# Patient Record
Sex: FEMALE | Race: WHITE | Employment: OTHER | URBAN - METROPOLITAN AREA
[De-identification: names, ages, dates, MRNs, and addresses within clinical notes are randomized per-mention and may not be internally consistent; named-entity substitution may affect disease eponyms.]

---

## 2018-05-03 LAB
T4 FREE SERPL-MCNC: 1.27 NG/DL (ref 0.6–1.7)
TSH SERPL DL<=0.05 MIU/L-ACNC: 0.29 UIU/M (ref 0.45–5.33)

## 2018-06-20 ENCOUNTER — TRANSCRIBE ORDERS (OUTPATIENT)
Dept: ADMINISTRATIVE | Facility: HOSPITAL | Age: 78
End: 2018-06-20

## 2018-06-20 DIAGNOSIS — M81.0 OSTEOPOROSIS, UNSPECIFIED OSTEOPOROSIS TYPE, UNSPECIFIED PATHOLOGICAL FRACTURE PRESENCE: Primary | ICD-10-CM

## 2018-07-23 ENCOUNTER — HOSPITAL ENCOUNTER (OUTPATIENT)
Dept: BONE DENSITY | Facility: HOSPITAL | Age: 78
Discharge: HOME | End: 2018-07-23
Attending: INTERNAL MEDICINE
Payer: MEDICARE

## 2018-07-23 DIAGNOSIS — M81.0 OSTEOPOROSIS, UNSPECIFIED OSTEOPOROSIS TYPE, UNSPECIFIED PATHOLOGICAL FRACTURE PRESENCE: ICD-10-CM

## 2018-07-23 PROCEDURE — 77080 DXA BONE DENSITY AXIAL: CPT

## 2018-11-26 ENCOUNTER — APPOINTMENT (OUTPATIENT)
Dept: LAB | Facility: MEDICAL CENTER | Age: 78
End: 2018-11-26
Payer: MEDICARE

## 2018-11-26 ENCOUNTER — TRANSCRIBE ORDERS (OUTPATIENT)
Dept: LAB | Facility: MEDICAL CENTER | Age: 78
End: 2018-11-26

## 2018-11-26 DIAGNOSIS — M81.0 OSTEOPOROSIS, UNSPECIFIED OSTEOPOROSIS TYPE, UNSPECIFIED PATHOLOGICAL FRACTURE PRESENCE: Primary | ICD-10-CM

## 2018-11-26 DIAGNOSIS — I10 HYPERTENSION, UNSPECIFIED TYPE: ICD-10-CM

## 2018-11-26 DIAGNOSIS — E07.9 THYROID DYSFUNCTION: ICD-10-CM

## 2018-11-26 DIAGNOSIS — E78.5 HYPERLIPIDEMIA, UNSPECIFIED HYPERLIPIDEMIA TYPE: ICD-10-CM

## 2018-11-26 DIAGNOSIS — M81.0 OSTEOPOROSIS, UNSPECIFIED OSTEOPOROSIS TYPE, UNSPECIFIED PATHOLOGICAL FRACTURE PRESENCE: ICD-10-CM

## 2018-11-26 LAB
ALBUMIN SERPL BCP-MCNC: 3.5 G/DL (ref 3.5–5)
ALP SERPL-CCNC: 41 U/L (ref 46–116)
ALT SERPL W P-5'-P-CCNC: 20 U/L (ref 12–78)
ANION GAP SERPL CALCULATED.3IONS-SCNC: 5 MMOL/L (ref 4–13)
AST SERPL W P-5'-P-CCNC: 20 U/L (ref 5–45)
BILIRUB SERPL-MCNC: 0.2 MG/DL (ref 0.2–1)
BUN SERPL-MCNC: 20 MG/DL (ref 5–25)
CALCIUM SERPL-MCNC: 9.3 MG/DL (ref 8.3–10.1)
CHLORIDE SERPL-SCNC: 105 MMOL/L (ref 100–108)
CHOLEST SERPL-MCNC: 221 MG/DL (ref 50–200)
CO2 SERPL-SCNC: 27 MMOL/L (ref 21–32)
CREAT SERPL-MCNC: 0.83 MG/DL (ref 0.6–1.3)
ERYTHROCYTE [DISTWIDTH] IN BLOOD BY AUTOMATED COUNT: 13.7 % (ref 11.6–15.1)
GFR SERPL CREATININE-BSD FRML MDRD: 67 ML/MIN/1.73SQ M
GLUCOSE P FAST SERPL-MCNC: 85 MG/DL (ref 65–99)
HCT VFR BLD AUTO: 38.6 % (ref 34.8–46.1)
HDLC SERPL-MCNC: 50 MG/DL (ref 40–60)
HGB BLD-MCNC: 12.1 G/DL (ref 11.5–15.4)
LDLC SERPL CALC-MCNC: 139 MG/DL (ref 0–100)
MCH RBC QN AUTO: 30.3 PG (ref 26.8–34.3)
MCHC RBC AUTO-ENTMCNC: 31.3 G/DL (ref 31.4–37.4)
MCV RBC AUTO: 97 FL (ref 82–98)
NONHDLC SERPL-MCNC: 171 MG/DL
PLATELET # BLD AUTO: 191 THOUSANDS/UL (ref 149–390)
PMV BLD AUTO: 10.8 FL (ref 8.9–12.7)
POTASSIUM SERPL-SCNC: 4.1 MMOL/L (ref 3.5–5.3)
PROT SERPL-MCNC: 6.9 G/DL (ref 6.4–8.2)
RBC # BLD AUTO: 4 MILLION/UL (ref 3.81–5.12)
SODIUM SERPL-SCNC: 137 MMOL/L (ref 136–145)
T4 SERPL-MCNC: 9.5 UG/DL (ref 4.7–13.3)
TRIGL SERPL-MCNC: 160 MG/DL
TSH SERPL DL<=0.05 MIU/L-ACNC: 5.41 UIU/ML (ref 0.36–3.74)
WBC # BLD AUTO: 3.95 THOUSAND/UL (ref 4.31–10.16)

## 2018-11-26 PROCEDURE — 85027 COMPLETE CBC AUTOMATED: CPT

## 2018-11-26 PROCEDURE — 80053 COMPREHEN METABOLIC PANEL: CPT

## 2018-11-26 PROCEDURE — 84443 ASSAY THYROID STIM HORMONE: CPT

## 2018-11-26 PROCEDURE — 36415 COLL VENOUS BLD VENIPUNCTURE: CPT

## 2018-11-26 PROCEDURE — 80061 LIPID PANEL: CPT

## 2018-11-26 PROCEDURE — 84436 ASSAY OF TOTAL THYROXINE: CPT

## 2019-08-03 ENCOUNTER — APPOINTMENT (EMERGENCY)
Dept: RADIOLOGY | Facility: HOSPITAL | Age: 79
DRG: 282 | End: 2019-08-03
Payer: MEDICARE

## 2019-08-03 ENCOUNTER — APPOINTMENT (EMERGENCY)
Dept: CT IMAGING | Facility: HOSPITAL | Age: 79
DRG: 282 | End: 2019-08-03
Payer: MEDICARE

## 2019-08-03 ENCOUNTER — HOSPITAL ENCOUNTER (INPATIENT)
Facility: HOSPITAL | Age: 79
LOS: 2 days | Discharge: HOME | DRG: 282 | End: 2019-08-06
Attending: EMERGENCY MEDICINE | Admitting: FAMILY MEDICINE
Payer: MEDICARE

## 2019-08-03 DIAGNOSIS — R00.2 PALPITATIONS: ICD-10-CM

## 2019-08-03 DIAGNOSIS — Z86.79 HISTORY OF SUPRAVENTRICULAR TACHYCARDIA: ICD-10-CM

## 2019-08-03 DIAGNOSIS — R42 DIZZINESS: ICD-10-CM

## 2019-08-03 DIAGNOSIS — R11.2 NAUSEA AND VOMITING: ICD-10-CM

## 2019-08-03 DIAGNOSIS — I47.1 PAROXYSMAL SVT (SUPRAVENTRICULAR TACHYCARDIA) (HCC): Chronic | ICD-10-CM

## 2019-08-03 DIAGNOSIS — R77.8 ELEVATED TROPONIN: ICD-10-CM

## 2019-08-03 DIAGNOSIS — R55 NEAR SYNCOPE: ICD-10-CM

## 2019-08-03 DIAGNOSIS — E78.49 OTHER HYPERLIPIDEMIA: ICD-10-CM

## 2019-08-03 DIAGNOSIS — I10 ESSENTIAL HYPERTENSION: ICD-10-CM

## 2019-08-03 DIAGNOSIS — I21.4 NSTEMI, INITIAL EPISODE OF CARE (HCC): Primary | ICD-10-CM

## 2019-08-03 LAB
ALBUMIN SERPL BCP-MCNC: 3.8 G/DL (ref 3.5–5)
ALP SERPL-CCNC: 41 U/L (ref 46–116)
ALT SERPL W P-5'-P-CCNC: 19 U/L (ref 12–78)
ANION GAP SERPL CALCULATED.3IONS-SCNC: 15 MMOL/L (ref 4–13)
AST SERPL W P-5'-P-CCNC: 16 U/L (ref 5–45)
BACTERIA UR QL AUTO: NORMAL /HPF
BASOPHILS # BLD AUTO: 0.01 THOUSANDS/ΜL (ref 0–0.1)
BASOPHILS NFR BLD AUTO: 0 % (ref 0–1)
BILIRUB SERPL-MCNC: 0.5 MG/DL (ref 0.2–1)
BILIRUB UR QL STRIP: NEGATIVE
BUN SERPL-MCNC: 19 MG/DL (ref 5–25)
CALCIUM SERPL-MCNC: 8.7 MG/DL (ref 8.3–10.1)
CHLORIDE SERPL-SCNC: 99 MMOL/L (ref 100–108)
CLARITY UR: CLEAR
CO2 SERPL-SCNC: 22 MMOL/L (ref 21–32)
COLOR UR: YELLOW
CREAT SERPL-MCNC: 0.71 MG/DL (ref 0.6–1.3)
EOSINOPHIL # BLD AUTO: 0 THOUSAND/ΜL (ref 0–0.61)
EOSINOPHIL NFR BLD AUTO: 0 % (ref 0–6)
ERYTHROCYTE [DISTWIDTH] IN BLOOD BY AUTOMATED COUNT: 13.2 % (ref 11.6–15.1)
GFR SERPL CREATININE-BSD FRML MDRD: 81 ML/MIN/1.73SQ M
GLUCOSE SERPL-MCNC: 133 MG/DL (ref 65–140)
GLUCOSE UR STRIP-MCNC: NEGATIVE MG/DL
HCT VFR BLD AUTO: 39 % (ref 34.8–46.1)
HGB BLD-MCNC: 12.9 G/DL (ref 11.5–15.4)
HGB UR QL STRIP.AUTO: NEGATIVE
IMM GRANULOCYTES # BLD AUTO: 0.03 THOUSAND/UL (ref 0–0.2)
IMM GRANULOCYTES NFR BLD AUTO: 1 % (ref 0–2)
KETONES UR STRIP-MCNC: ABNORMAL MG/DL
LEUKOCYTE ESTERASE UR QL STRIP: NEGATIVE
LYMPHOCYTES # BLD AUTO: 1.3 THOUSANDS/ΜL (ref 0.6–4.47)
LYMPHOCYTES NFR BLD AUTO: 20 % (ref 14–44)
MAGNESIUM SERPL-MCNC: 2 MG/DL (ref 1.6–2.6)
MCH RBC QN AUTO: 30.6 PG (ref 26.8–34.3)
MCHC RBC AUTO-ENTMCNC: 33.1 G/DL (ref 31.4–37.4)
MCV RBC AUTO: 93 FL (ref 82–98)
MONOCYTES # BLD AUTO: 0.23 THOUSAND/ΜL (ref 0.17–1.22)
MONOCYTES NFR BLD AUTO: 4 % (ref 4–12)
NEUTROPHILS # BLD AUTO: 4.93 THOUSANDS/ΜL (ref 1.85–7.62)
NEUTS SEG NFR BLD AUTO: 75 % (ref 43–75)
NITRITE UR QL STRIP: NEGATIVE
NON-SQ EPI CELLS URNS QL MICRO: NORMAL /HPF
NRBC BLD AUTO-RTO: 0 /100 WBCS
PH UR STRIP.AUTO: 7.5 [PH]
PHOSPHATE SERPL-MCNC: 2.6 MG/DL (ref 2.3–4.1)
PLATELET # BLD AUTO: 251 THOUSANDS/UL (ref 149–390)
PMV BLD AUTO: 9.9 FL (ref 8.9–12.7)
POTASSIUM SERPL-SCNC: 3.6 MMOL/L (ref 3.5–5.3)
PROT SERPL-MCNC: 6.9 G/DL (ref 6.4–8.2)
PROT UR STRIP-MCNC: ABNORMAL MG/DL
RBC # BLD AUTO: 4.21 MILLION/UL (ref 3.81–5.12)
RBC #/AREA URNS AUTO: NORMAL /HPF
SODIUM SERPL-SCNC: 136 MMOL/L (ref 136–145)
SP GR UR STRIP.AUTO: 1.01 (ref 1–1.03)
TROPONIN I SERPL-MCNC: <0.02 NG/ML
TSH SERPL DL<=0.05 MIU/L-ACNC: 0.6 UIU/ML (ref 0.36–3.74)
UROBILINOGEN UR QL STRIP.AUTO: 0.2 E.U./DL
WBC # BLD AUTO: 6.5 THOUSAND/UL (ref 4.31–10.16)
WBC #/AREA URNS AUTO: NORMAL /HPF

## 2019-08-03 PROCEDURE — 96374 THER/PROPH/DIAG INJ IV PUSH: CPT

## 2019-08-03 PROCEDURE — 84484 ASSAY OF TROPONIN QUANT: CPT | Performed by: EMERGENCY MEDICINE

## 2019-08-03 PROCEDURE — 83735 ASSAY OF MAGNESIUM: CPT | Performed by: EMERGENCY MEDICINE

## 2019-08-03 PROCEDURE — 99285 EMERGENCY DEPT VISIT HI MDM: CPT

## 2019-08-03 PROCEDURE — 85025 COMPLETE CBC W/AUTO DIFF WBC: CPT | Performed by: EMERGENCY MEDICINE

## 2019-08-03 PROCEDURE — 96361 HYDRATE IV INFUSION ADD-ON: CPT

## 2019-08-03 PROCEDURE — 80053 COMPREHEN METABOLIC PANEL: CPT | Performed by: EMERGENCY MEDICINE

## 2019-08-03 PROCEDURE — 71046 X-RAY EXAM CHEST 2 VIEWS: CPT

## 2019-08-03 PROCEDURE — 1124F ACP DISCUSS-NO DSCNMKR DOCD: CPT | Performed by: FAMILY MEDICINE

## 2019-08-03 PROCEDURE — 99285 EMERGENCY DEPT VISIT HI MDM: CPT | Performed by: EMERGENCY MEDICINE

## 2019-08-03 PROCEDURE — 81001 URINALYSIS AUTO W/SCOPE: CPT | Performed by: EMERGENCY MEDICINE

## 2019-08-03 PROCEDURE — 70450 CT HEAD/BRAIN W/O DYE: CPT

## 2019-08-03 PROCEDURE — 84443 ASSAY THYROID STIM HORMONE: CPT | Performed by: EMERGENCY MEDICINE

## 2019-08-03 PROCEDURE — 93005 ELECTROCARDIOGRAM TRACING: CPT

## 2019-08-03 PROCEDURE — 36415 COLL VENOUS BLD VENIPUNCTURE: CPT | Performed by: EMERGENCY MEDICINE

## 2019-08-03 PROCEDURE — 84100 ASSAY OF PHOSPHORUS: CPT | Performed by: EMERGENCY MEDICINE

## 2019-08-03 RX ORDER — TRAZODONE HYDROCHLORIDE 50 MG/1
50 TABLET ORAL
COMMUNITY

## 2019-08-03 RX ORDER — METOPROLOL SUCCINATE 50 MG/1
50 TABLET, EXTENDED RELEASE ORAL DAILY
Status: DISCONTINUED | OUTPATIENT
Start: 2019-08-04 | End: 2019-08-06 | Stop reason: HOSPADM

## 2019-08-03 RX ORDER — ONDANSETRON 2 MG/ML
4 INJECTION INTRAMUSCULAR; INTRAVENOUS ONCE
Status: COMPLETED | OUTPATIENT
Start: 2019-08-03 | End: 2019-08-03

## 2019-08-03 RX ORDER — LEVOTHYROXINE SODIUM 112 UG/1
112 TABLET ORAL DAILY
COMMUNITY

## 2019-08-03 RX ORDER — ONDANSETRON 2 MG/ML
4 INJECTION INTRAMUSCULAR; INTRAVENOUS EVERY 6 HOURS PRN
Status: DISCONTINUED | OUTPATIENT
Start: 2019-08-03 | End: 2019-08-06 | Stop reason: HOSPADM

## 2019-08-03 RX ORDER — MECLIZINE HCL 12.5 MG/1
25 TABLET ORAL ONCE
Status: COMPLETED | OUTPATIENT
Start: 2019-08-03 | End: 2019-08-03

## 2019-08-03 RX ORDER — LEVOTHYROXINE SODIUM 0.1 MG/1
100 TABLET ORAL
Status: DISCONTINUED | OUTPATIENT
Start: 2019-08-04 | End: 2019-08-06 | Stop reason: HOSPADM

## 2019-08-03 RX ORDER — METOPROLOL SUCCINATE 50 MG/1
50 TABLET, EXTENDED RELEASE ORAL DAILY
COMMUNITY
End: 2019-08-06 | Stop reason: HOSPADM

## 2019-08-03 RX ORDER — SODIUM CHLORIDE 9 MG/ML
75 INJECTION, SOLUTION INTRAVENOUS CONTINUOUS
Status: DISCONTINUED | OUTPATIENT
Start: 2019-08-03 | End: 2019-08-06 | Stop reason: HOSPADM

## 2019-08-03 RX ORDER — SACCHAROMYCES BOULARDII 250 MG
250 CAPSULE ORAL 2 TIMES DAILY
COMMUNITY
End: 2021-05-14 | Stop reason: HOSPADM

## 2019-08-03 RX ORDER — MELATONIN
1000 DAILY
Status: DISCONTINUED | OUTPATIENT
Start: 2019-08-04 | End: 2019-08-06 | Stop reason: HOSPADM

## 2019-08-03 RX ORDER — MELATONIN
1000 DAILY
COMMUNITY

## 2019-08-03 RX ORDER — SACCHAROMYCES BOULARDII 250 MG
250 CAPSULE ORAL 2 TIMES DAILY
Status: DISCONTINUED | OUTPATIENT
Start: 2019-08-04 | End: 2019-08-06 | Stop reason: HOSPADM

## 2019-08-03 RX ADMIN — SODIUM CHLORIDE 500 ML: 0.9 INJECTION, SOLUTION INTRAVENOUS at 20:01

## 2019-08-03 RX ADMIN — ONDANSETRON HYDROCHLORIDE 4 MG: 2 SOLUTION INTRAMUSCULAR; INTRAVENOUS at 20:02

## 2019-08-03 RX ADMIN — MECLIZINE 25 MG: 12.5 TABLET ORAL at 20:00

## 2019-08-04 PROBLEM — E03.8 OTHER SPECIFIED HYPOTHYROIDISM: Status: ACTIVE | Noted: 2019-08-04

## 2019-08-04 PROBLEM — R55 NEAR SYNCOPE: Status: ACTIVE | Noted: 2019-08-04

## 2019-08-04 LAB
ALBUMIN SERPL BCP-MCNC: 3.7 G/DL (ref 3.5–5)
ALP SERPL-CCNC: 44 U/L (ref 46–116)
ALT SERPL W P-5'-P-CCNC: 17 U/L (ref 12–78)
ANION GAP SERPL CALCULATED.3IONS-SCNC: 11 MMOL/L (ref 4–13)
APTT PPP: 32 SECONDS (ref 23–37)
AST SERPL W P-5'-P-CCNC: 19 U/L (ref 5–45)
BILIRUB SERPL-MCNC: 0.6 MG/DL (ref 0.2–1)
BUN SERPL-MCNC: 13 MG/DL (ref 5–25)
CALCIUM SERPL-MCNC: 8.5 MG/DL (ref 8.3–10.1)
CHLORIDE SERPL-SCNC: 100 MMOL/L (ref 100–108)
CHOLEST SERPL-MCNC: 254 MG/DL (ref 50–200)
CO2 SERPL-SCNC: 24 MMOL/L (ref 21–32)
CREAT SERPL-MCNC: 0.64 MG/DL (ref 0.6–1.3)
ERYTHROCYTE [DISTWIDTH] IN BLOOD BY AUTOMATED COUNT: 13.4 % (ref 11.6–15.1)
EST. AVERAGE GLUCOSE BLD GHB EST-MCNC: 111 MG/DL
GFR SERPL CREATININE-BSD FRML MDRD: 85 ML/MIN/1.73SQ M
GLUCOSE SERPL-MCNC: 96 MG/DL (ref 65–140)
HBA1C MFR BLD: 5.5 % (ref 4.2–6.3)
HCT VFR BLD AUTO: 36.6 % (ref 34.8–46.1)
HDLC SERPL-MCNC: 70 MG/DL (ref 40–60)
HGB BLD-MCNC: 12.1 G/DL (ref 11.5–15.4)
INR PPP: 1 (ref 0.84–1.19)
LDLC SERPL CALC-MCNC: 176 MG/DL (ref 0–100)
MAGNESIUM SERPL-MCNC: 2.1 MG/DL (ref 1.6–2.6)
MCH RBC QN AUTO: 31.1 PG (ref 26.8–34.3)
MCHC RBC AUTO-ENTMCNC: 33.1 G/DL (ref 31.4–37.4)
MCV RBC AUTO: 94 FL (ref 82–98)
NONHDLC SERPL-MCNC: 184 MG/DL
PHOSPHATE SERPL-MCNC: 2.9 MG/DL (ref 2.3–4.1)
PLATELET # BLD AUTO: 215 THOUSANDS/UL (ref 149–390)
PLATELET # BLD AUTO: 221 THOUSANDS/UL (ref 149–390)
PMV BLD AUTO: 9.9 FL (ref 8.9–12.7)
PMV BLD AUTO: 9.9 FL (ref 8.9–12.7)
POTASSIUM SERPL-SCNC: 3.7 MMOL/L (ref 3.5–5.3)
PROT SERPL-MCNC: 6.9 G/DL (ref 6.4–8.2)
PROTHROMBIN TIME: 13.2 SECONDS (ref 11.6–14.5)
RBC # BLD AUTO: 3.89 MILLION/UL (ref 3.81–5.12)
SODIUM SERPL-SCNC: 135 MMOL/L (ref 136–145)
TRIGL SERPL-MCNC: 42 MG/DL
TROPONIN I SERPL-MCNC: 0.08 NG/ML
TROPONIN I SERPL-MCNC: 0.17 NG/ML
TROPONIN I SERPL-MCNC: 0.17 NG/ML
WBC # BLD AUTO: 6.98 THOUSAND/UL (ref 4.31–10.16)

## 2019-08-04 PROCEDURE — 84484 ASSAY OF TROPONIN QUANT: CPT | Performed by: PHYSICIAN ASSISTANT

## 2019-08-04 PROCEDURE — G8979 MOBILITY GOAL STATUS: HCPCS | Performed by: PHYSICAL THERAPIST

## 2019-08-04 PROCEDURE — 83036 HEMOGLOBIN GLYCOSYLATED A1C: CPT | Performed by: PHYSICIAN ASSISTANT

## 2019-08-04 PROCEDURE — G8978 MOBILITY CURRENT STATUS: HCPCS | Performed by: PHYSICAL THERAPIST

## 2019-08-04 PROCEDURE — 80061 LIPID PANEL: CPT | Performed by: PHYSICIAN ASSISTANT

## 2019-08-04 PROCEDURE — G8989 SELF CARE D/C STATUS: HCPCS | Performed by: DEVELOPMENTAL THERAPIST

## 2019-08-04 PROCEDURE — 99222 1ST HOSP IP/OBS MODERATE 55: CPT | Performed by: FAMILY MEDICINE

## 2019-08-04 PROCEDURE — 97166 OT EVAL MOD COMPLEX 45 MIN: CPT | Performed by: DEVELOPMENTAL THERAPIST

## 2019-08-04 PROCEDURE — G8987 SELF CARE CURRENT STATUS: HCPCS | Performed by: DEVELOPMENTAL THERAPIST

## 2019-08-04 PROCEDURE — 85027 COMPLETE CBC AUTOMATED: CPT | Performed by: PHYSICIAN ASSISTANT

## 2019-08-04 PROCEDURE — 85049 AUTOMATED PLATELET COUNT: CPT | Performed by: PHYSICIAN ASSISTANT

## 2019-08-04 PROCEDURE — 84100 ASSAY OF PHOSPHORUS: CPT | Performed by: PHYSICIAN ASSISTANT

## 2019-08-04 PROCEDURE — 80053 COMPREHEN METABOLIC PANEL: CPT | Performed by: PHYSICIAN ASSISTANT

## 2019-08-04 PROCEDURE — 97163 PT EVAL HIGH COMPLEX 45 MIN: CPT | Performed by: PHYSICAL THERAPIST

## 2019-08-04 PROCEDURE — 85610 PROTHROMBIN TIME: CPT | Performed by: PHYSICIAN ASSISTANT

## 2019-08-04 PROCEDURE — 85730 THROMBOPLASTIN TIME PARTIAL: CPT | Performed by: PHYSICIAN ASSISTANT

## 2019-08-04 PROCEDURE — G8988 SELF CARE GOAL STATUS: HCPCS | Performed by: DEVELOPMENTAL THERAPIST

## 2019-08-04 PROCEDURE — 97535 SELF CARE MNGMENT TRAINING: CPT | Performed by: DEVELOPMENTAL THERAPIST

## 2019-08-04 PROCEDURE — 83735 ASSAY OF MAGNESIUM: CPT | Performed by: PHYSICIAN ASSISTANT

## 2019-08-04 RX ORDER — ASPIRIN 81 MG/1
81 TABLET, CHEWABLE ORAL DAILY
Status: DISCONTINUED | OUTPATIENT
Start: 2019-08-04 | End: 2019-08-06 | Stop reason: HOSPADM

## 2019-08-04 RX ORDER — ACETAMINOPHEN 325 MG/1
650 TABLET ORAL EVERY 6 HOURS PRN
Status: DISCONTINUED | OUTPATIENT
Start: 2019-08-04 | End: 2019-08-06 | Stop reason: HOSPADM

## 2019-08-04 RX ORDER — TRAZODONE HYDROCHLORIDE 50 MG/1
50 TABLET ORAL
Status: DISCONTINUED | OUTPATIENT
Start: 2019-08-04 | End: 2019-08-06 | Stop reason: HOSPADM

## 2019-08-04 RX ORDER — ATORVASTATIN CALCIUM 10 MG/1
20 TABLET, FILM COATED ORAL
Status: DISCONTINUED | OUTPATIENT
Start: 2019-08-04 | End: 2019-08-06 | Stop reason: HOSPADM

## 2019-08-04 RX ADMIN — ASPIRIN 81 MG 81 MG: 81 TABLET ORAL at 10:36

## 2019-08-04 RX ADMIN — TRAZODONE HYDROCHLORIDE 50 MG: 50 TABLET ORAL at 22:03

## 2019-08-04 RX ADMIN — Medication 30 MG: at 10:36

## 2019-08-04 RX ADMIN — MELATONIN 1000 UNITS: at 08:20

## 2019-08-04 RX ADMIN — Medication 250 MG: at 17:10

## 2019-08-04 RX ADMIN — ACETAMINOPHEN 650 MG: 325 TABLET, FILM COATED ORAL at 22:03

## 2019-08-04 RX ADMIN — Medication 250 MG: at 08:08

## 2019-08-04 RX ADMIN — SODIUM CHLORIDE 75 ML/HR: 0.9 INJECTION, SOLUTION INTRAVENOUS at 00:00

## 2019-08-04 RX ADMIN — METOPROLOL SUCCINATE 50 MG: 50 TABLET, EXTENDED RELEASE ORAL at 08:08

## 2019-08-04 RX ADMIN — SODIUM CHLORIDE 75 ML/HR: 0.9 INJECTION, SOLUTION INTRAVENOUS at 13:40

## 2019-08-04 RX ADMIN — ACETAMINOPHEN 650 MG: 325 TABLET, FILM COATED ORAL at 13:38

## 2019-08-04 RX ADMIN — ENOXAPARIN SODIUM 40 MG: 40 INJECTION SUBCUTANEOUS at 08:09

## 2019-08-04 RX ADMIN — Medication 400 MG: at 00:00

## 2019-08-04 RX ADMIN — ATORVASTATIN CALCIUM 20 MG: 10 TABLET, FILM COATED ORAL at 17:10

## 2019-08-04 NOTE — H&P
[facility] Internal Medicine  H&P- Rosalina Muff 1940, [de-identified] y o  female MRN: [id number]    Unit/Bed#: 404-01 Encounter: [id number]    Primary Care Provider: Garth Wright DO   Date and time admitted to hospital: 8/3/2019  7:24 PM        * Near syncope  Assessment & Plan  She report that she had onset of palpitations about 1 pm  Followed by onset of dizziness, nausea and vomiting  She said that she felt weak as though she was going to pass out  Head CT shows-No acute intracranial abnormality    No recent trauma,   Admit on observation  Telemetry monitoring  Orthostatic Vital signs  IV normal saline  Continue home medications  Monitor Vital signs  Check ECHO when available  Am labs  Supportive care      Dizziness  Assessment & Plan  She reports onset of dizziness   Associated with nausea, vomiting, lightheadedness  She reports dizziness started after onset of palpitations  She received meclizine 25 mg PO in the ER   PRN meclizine   IV normal saline   Monitor for new onset of headache, or additional episodes of vomiting  Close monitoring      Palpitations  Assessment & Plan  She reports that she had onset of palpitations  She also reports having episodes of SVT in the past  EKG shows-Sinus rhythm with PACs at rate of 94 bpm  Telemetry monitoring  Trend troponin X 3 sets  Repeat EKG if new onset of Palpitations  Consider ECHO when available  Consider inpatient Cardiology consult when available    Other hyperlipidemia  Assessment & Plan  Not currently on medication regimen  Will check lipid panel  Encourage close PCP follow up    Essential hypertension  Assessment & Plan  Blood pressure initially elevated upon arrival to the ER  Currently stable  Continue home medications    Other specified hypothyroidism  Assessment & Plan  Continue levothyroxine          VTE Prophylaxis: Enoxaparin (Lovenox)  / sequential compression device   Code Status: Level 3- DNAR/DNI  POLST: POLST form is not discussed and not completed at this time  Discussion with family: None    Anticipated Length of Stay:  Patient will be admitted on an Observation basis with an anticipated length of stay of  , 2 midnights  Justification for Hospital Stay: Near Syncope, palpitations,     Total Time for Visit, including Counseling / Coordination of Care: 30 minutes  Greater than 50% of this total time spent on direct patient counseling and coordination of care  Chief Complaint:   palpitations    History of Present Illness:    Velma Kirk is a [de-identified] y o  female who presents to the ER with complaints of palpitations starting about 1 pm yesterday followed by episodes of dizziness, nausea and vomiting  She denies chest pan or discomfort, headaches, fever, chills, abdominal pain, visual disturbances, numbness, tingling  She has a history of hypertension, hyperlipidemia and hypothyroidism  She also reports that she has had episodes of SVT in the past   However EKG upon arrival in emergency room showed sinus rhythm with PACs at a rate of 94 beats per minute  She reports that episode of palpitation felt similar to when she was diagnosed with SVT  She states that the palpitations subsided however a dizziness nausea and vomiting continued prompted her to come to the emergency room  Labs completed in emergency room with results as shown below  A CT completed with results as shown below  Chest x-ray completed official report pending  She received meclizine 25 mg p o , Zofran 4 mg IV and normal saline 500 cc bolus  Had bedside she is in no acute distress she denies currently having palpitations however she states that she still has residual dizziness but only when she moves around  Review of Systems:    Review of Systems   Constitutional: Negative for activity change, appetite change, chills and fever  HENT: Positive for congestion  Negative for sore throat, tinnitus, trouble swallowing and voice change      Eyes: Negative for photophobia, pain, redness and visual disturbance  Respiratory: Negative for cough, chest tightness, shortness of breath and wheezing  Cardiovascular: Positive for palpitations  Negative for chest pain and leg swelling  Gastrointestinal: Positive for nausea and vomiting  Negative for abdominal pain  Endocrine: Negative for polydipsia, polyphagia and polyuria  Genitourinary: Positive for frequency  Negative for decreased urine volume, difficulty urinating, flank pain and hematuria  Musculoskeletal: Negative for arthralgias, back pain, gait problem and joint swelling  Skin: Negative for color change, pallor and rash  Neurological: Positive for dizziness, weakness, light-headedness and headaches  Negative for speech difficulty  Psychiatric/Behavioral: Positive for sleep disturbance  Negative for agitation and confusion  The patient is not nervous/anxious  Past Medical and Surgical History:     Past Medical History:   Diagnosis Date    Hyperlipidemia     Hypertension        Past Surgical History:   Procedure Laterality Date    CHOLECYSTECTOMY      TOTAL HIP ARTHROPLASTY         Meds/Allergies:    Prior to Admission medications    Medication Sig Start Date End Date Taking?  Authorizing Provider   cholecalciferol (VITAMIN D3) 1,000 units tablet Take 1,000 Units by mouth daily   Yes Historical Provider, MD   co-enzyme Q-10 30 MG capsule Take 30 mg by mouth daily   Yes Historical Provider, MD   levothyroxine 100 mcg tablet Take 100 mcg by mouth daily   Yes Historical Provider, MD   linaCLOtide (LINZESS PO) Take 1 each by mouth daily   Yes Historical Provider, MD   magnesium oxide (MAG-OX) 400 mg Take 400 mg by mouth once   Yes Historical Provider, MD   metoprolol succinate (TOPROL-XL) 50 mg 24 hr tablet Take 50 mg by mouth daily   Yes Historical Provider, MD   saccharomyces boulardii (FLORASTOR) 250 mg capsule Take 250 mg by mouth 2 (two) times a day   Yes Historical Provider, MD   traZODone (DESYREL) 50 mg tablet Take 50 mg by mouth daily at bedtime   Yes Historical Provider, MD     I have reviewed home medications with patient personally  Allergies: Allergies   Allergen Reactions    Penicillins Anaphylaxis    Vicodin [Hydrocodone-Acetaminophen] Vomiting       Social History:     Marital Status: /Civil Union   Occupation: retired  Patient Pre-hospital Living Situation: Lives with   Patient Pre-hospital Level of Mobility: Active  Patient Pre-hospital Diet Restrictions: None reported  Substance Use History:   Social History     Substance and Sexual Activity   Alcohol Use Never    Alcohol/week: 0 0 standard drinks    Frequency: Never    Drinks per session: Patient refused    Binge frequency: Never    Comment: n/a     Social History     Tobacco Use   Smoking Status Never Smoker   Smokeless Tobacco Never Used     Social History     Substance and Sexual Activity   Drug Use Never       Family History:    History reviewed  No pertinent family history  Physical Exam:     Vitals:   Blood Pressure: 156/79 (08/04/19 0001)  Pulse: 94 (08/04/19 0001)  Temperature: 98 3 °F (36 8 °C) (08/03/19 2359)  Temp Source: Temporal (08/03/19 1927)  Respirations: 18 (08/04/19 0001)  Height: 4' 10" (147 3 cm) (08/03/19 1927)  Weight - Scale: 66 3 kg (146 lb 2 6 oz) (08/03/19 1927)  SpO2: 99 % (08/04/19 0001)    Physical Exam   Constitutional: She is oriented to person, place, and time  No distress  HENT:   Head: Normocephalic and atraumatic  Mouth/Throat: Oropharynx is clear and moist    Eyes: Pupils are equal, round, and reactive to light  Left eye exhibits no discharge  No scleral icterus  Neck: No JVD present  Cardiovascular: Normal rate, regular rhythm and normal heart sounds  Pulmonary/Chest: Effort normal and breath sounds normal  No stridor  No respiratory distress  She has no wheezes  She has no rales  Abdominal: Bowel sounds are normal  She exhibits no distension  There is no tenderness     Musculoskeletal: She exhibits no edema, tenderness or deformity  Lymphadenopathy:     She has no cervical adenopathy  Neurological: She is oriented to person, place, and time  Skin: Skin is warm  No rash noted  She is not diaphoretic  No erythema  No pallor  Vitals reviewed  Additional Data:     Lab Results: I have personally reviewed pertinent reports  Results from last 7 days   Lab Units 08/04/19 0057 08/03/19 1956   WBC Thousand/uL  --  6 50   HEMOGLOBIN g/dL  --  12 9   HEMATOCRIT %  --  39 0   PLATELETS Thousands/uL 221 251   NEUTROS PCT %  --  75   LYMPHS PCT %  --  20   MONOS PCT %  --  4   EOS PCT %  --  0     Results from last 7 days   Lab Units 08/03/19 1956   SODIUM mmol/L 136   POTASSIUM mmol/L 3 6   CHLORIDE mmol/L 99*   CO2 mmol/L 22   BUN mg/dL 19   CREATININE mg/dL 0 71   ANION GAP mmol/L 15*   CALCIUM mg/dL 8 7   ALBUMIN g/dL 3 8   TOTAL BILIRUBIN mg/dL 0 50   ALK PHOS U/L 41*   ALT U/L 19   AST U/L 16   GLUCOSE RANDOM mg/dL 133                       Imaging: I have personally reviewed pertinent reports  CT head without contrast   Final Result by Odalis Grover DO (08/03 2054)      No acute intracranial abnormality  Workstation performed: [id number]         XR chest 2 views   ED Interpretation by Mei Harry MD (08/03 2132)   No cardiomegaly  No infiltrates  No congestive heart failure  EKG, Pathology, and Other Studies Reviewed on Admission:   · EKG: Sinus rhythm with PACs at rate of 94 bpm    Allscripts / Epic Records Reviewed: Yes     ** Please Note: This note has been constructed using a voice recognition system   **

## 2019-08-04 NOTE — UTILIZATION REVIEW
Initial Clinical Review    Admission: Date/Time/Statement: Observation 8/3/19 @ 2247    Orders Placed This Encounter   Procedures    Place in Observation (expected length of stay for this patient is less than two midnights)     Standing Status:   Standing     Number of Occurrences:   1     Order Specific Question:   Admitting Physician     Answer:   Maritza Hatfield     Order Specific Question:   Level of Care     Answer:   Med Surg [16]     ED Arrival Information     Expected Arrival Acuity Means of Arrival Escorted By Service Admission Type    - 8/3/2019 19:24 Emergent [address]  Ambulance General Medicine Emergency    Arrival Complaint    dizziness; nausea        Chief Complaint   Patient presents with    Palpitations     pt c/o palpitations, nausea, and vomiting since 1300 today  pt does have SVT history  Assessment/Plan: [de-identified]year old female to the ED from home via EMS with palpitations, dizziness  Admitted under observation for near syncope, dizziness and palpitations  She has a h/o SVT and started about 8 hours earlier with lightheadedness, nausea, vomiting, diarrhea and felt that her heart was "flip-flopping"  Upon arrival to the ED, her GCS=15, she continues with palpitations, noted to be sinus tachycardia on the monitor  Near syncope  Assessment & Plan  She report that she had onset of palpitations about 1 pm  Followed by onset of dizziness, nausea and vomiting  She said that she felt weak as though she was going to pass out  Head CT shows-No acute intracranial abnormality    No recent trauma,   Admit on observation  Telemetry monitoring  Orthostatic Vital signs  IV normal saline  Continue home medications  Monitor Vital signs  Check ECHO when available  Am labs  Supportive care        Dizziness  Assessment & Plan  She reports onset of dizziness   Associated with nausea, vomiting, lightheadedness  She reports dizziness started after onset of palpitations  She received meclizine 25 mg PO in the ER   PRN meclizine   IV normal saline   Monitor for new onset of headache, or additional episodes of vomiting  Close monitoring        Palpitations  Assessment & Plan  She reports that she had onset of palpitations  She also reports having episodes of SVT in the past  EKG shows-Sinus rhythm with PACs at rate of 94 bpm  Telemetry monitoring  Trend troponin X 3 sets  Repeat EKG if new onset of Palpitations  Consider ECHO when available  Consider inpatient Cardiology consult when available       ED Triage Vitals [08/03/19 1927]   Temperature Pulse Respirations Blood Pressure SpO2   98 1 °F (36 7 °C) (!) 106 22 (!) 184/82 97 %      Temp Source Heart Rate Source Patient Position - Orthostatic VS BP Location FiO2 (%)   Temporal Monitor Lying Right arm --      Pain Score       No Pain        Wt Readings from Last 1 Encounters:   08/03/19 66 3 kg (146 lb 2 6 oz)     Additional Vital Signs:   Date/Time  Temp  Pulse  Resp  BP  MAP (mmHg)  SpO2  O2 Device  Patient Position - Orthostatic VS   08/04/19 07:15:21  99 3 °F (37 4 °C)  72  20  141/72  95  99 %       08/04/19 00:01:47    94  18  156/79  105  99 %    Standing - Orthostatic VS   08/03/19 23:59:36  98 3 °F (36 8 °C)  93  17  147/75  99  97 %    Sitting - Orthostatic VS   08/03/19 23:55:46  98 3 °F (36 8 °C)  100  17  142/73  96  96 %    Lying - Orthostatic VS   08/03/19 2200    98  19  130/60    95 %       08/03/19 2100    91  20  140/74    97 %       08/03/19 2000    95  22  170/73               Pertinent Labs/Diagnostic Test Results:  CT head:    No acute intracranial abnormality  EKG:   Normal sinus rhythm with premature atrial contractions  Low-voltage QRS  Poor R-wave progression  No acute ischemic ST or T-wave changes     Results from last 7 days   Lab Units 08/04/19  0515 08/04/19  0057 08/03/19 1956   WBC Thousand/uL 6 98  --  6 50   HEMOGLOBIN g/dL 12 1  --  12 9   HEMATOCRIT % 36 6  --  39 0   PLATELETS Thousands/uL 215 221 251   NEUTROS ABS Thousands/µL  --   --  4 93         Results from last 7 days   Lab Units 08/04/19  0515 08/03/19  1956   SODIUM mmol/L 135* 136   POTASSIUM mmol/L 3 7 3 6   CHLORIDE mmol/L 100 99*   CO2 mmol/L 24 22   ANION GAP mmol/L 11 15*   BUN mg/dL 13 19   CREATININE mg/dL 0 64 0 71   EGFR ml/min/1 73sq m 85 81   CALCIUM mg/dL 8 5 8 7   MAGNESIUM mg/dL 2 1 2 0   PHOSPHORUS mg/dL 2 9 2 6     Results from last 7 days   Lab Units 08/04/19  0515 08/03/19  1956   AST U/L 19 16   ALT U/L 17 19   ALK PHOS U/L 44* 41*   TOTAL PROTEIN g/dL 6 9 6 9   ALBUMIN g/dL 3 7 3 8   TOTAL BILIRUBIN mg/dL 0 60 0 50         Results from last 7 days   Lab Units 08/04/19  0515 08/03/19  1956   GLUCOSE RANDOM mg/dL 96 133       Results from last 7 days   Lab Units 08/04/19  0635 08/04/19  0515 08/04/19  0057 08/03/19  1956   TROPONIN I ng/mL 0 17* 0 17* 0 08* <0 02         Results from last 7 days   Lab Units 08/04/19  0515   PROTIME seconds 13 2   INR  1 00   PTT seconds 32     Results from last 7 days   Lab Units 08/03/19  1956   TSH 3RD GENERATON uIU/mL 0 597       Results from last 7 days   Lab Units 08/03/19  2051   CLARITY UA  Clear   COLOR UA  Yellow   SPEC GRAV UA  1 015   PH UA  7 5   GLUCOSE UA mg/dl Negative   KETONES UA mg/dl >=80 (3+)*   BLOOD UA  Negative   PROTEIN UA mg/dl Trace*   NITRITE UA  Negative   BILIRUBIN UA  Negative   UROBILINOGEN UA E U /dl 0 2   LEUKOCYTES UA  Negative   WBC UA /hpf None Seen   RBC UA /hpf None Seen   BACTERIA UA /hpf Occasional   EPITHELIAL CELLS WET PREP /hpf Occasional     ED Treatment:   Medication Administration from 08/03/2019 1924 to 08/03/2019 2307       Date/Time Order Dose Route Action     08/03/2019 2000 meclizine (ANTIVERT) tablet 25 mg 25 mg Oral Given     08/03/2019 2001 sodium chloride 0 9 % bolus 500 mL 500 mL Intravenous New Bag     08/03/2019 2002 ondansetron (ZOFRAN) injection 4 mg 4 mg Intravenous Given        Past Medical History:   Diagnosis Date    Hyperlipidemia  Hypertension        Admitting Diagnosis: Palpitations [R00 2]  Dizziness [R42]  Nausea and vomiting [R11 2]  Age/Sex: [de-identified] y o  female  Admission Orders:  Tele  Up with assist  Current Facility-Administered Medications:  cholecalciferol 1,000 Units Oral Daily   co-enzyme Q-10 30 mg Oral Daily   enoxaparin 40 mg Subcutaneous Daily   levothyroxine 100 mcg Oral Early Morning   metoprolol succinate 50 mg Oral Daily   ondansetron 4 mg Intravenous Q6H PRN   saccharomyces boulardii 250 mg Oral BID   sodium chloride 75 mL/hr Intravenous Continuous       IP CONSULT TO CASE MANAGEMENT    Network Utilization Review Department  Phone: [phone]; Fax [phone]  [email or web address]  ATTENTION: Please call with any questions or concerns to [phone]  and carefully listen to the prompts so that you are directed to the right person  Send all requests for admission clinical reviews, approved or denied determinations and any other requests to fax [phone]   All voicemails are confidential

## 2019-08-04 NOTE — OCCUPATIONAL THERAPY NOTE
[address]  Evaluation     Patient Name: Ralf Miranda  SICDR'MARY Date: 8/4/2019  Problem List  Patient Active Problem List   Diagnosis    Essential hypertension    Other hyperlipidemia    Palpitations    Dizziness    Near syncope    Other specified hypothyroidism     Past Medical History  Past Medical History:   Diagnosis Date    Hyperlipidemia     Hypertension      Past Surgical History  Past Surgical History:   Procedure Laterality Date    CHOLECYSTECTOMY      TOTAL HIP ARTHROPLASTY           08/04/19 0846   Note Type   Note type Eval/Treat   Restrictions/Precautions   Other Precautions Bed Alarm; Chair Alarm;Telemetry   Pain Assessment   Pain Assessment No/denies pain   Pain Score No Pain   Home Living   Type of Home House  ([address] with hr)   Home Layout One level; Able to live on main level with bedroom/bathroom; Performs ADLs on one level  [place] Raz[employer] style home)   Bathroom Shower/Tub Tub/shower unit   Bathroom Toilet Raised   Bathroom Equipment Grab bars in shower; Shower chair   [facility]  (Does not use)   Prior Function   Level of [place] Independent with ADLs and functional mobility   Lives With Significant other  ( )   Receives Help From Family   ADL Assistance Independent   IADLs Independent   Vocational Retired   Comments Pt  cares for  with cognitive deficits, family drives to [employer], ambulates with no a d  at baseline    Lifestyle   Autonomy Pt   I with ADLs/IADLs and functional mobility    Reciprocal Relationships Pt  resides at home with ; stays in contact with family on a regular basis    ADL   Grooming Assistance 7  Independent   Toileting Assistance  7  Independent   Bed Mobility   Rolling L 7  Independent   Supine to Sit 7  Independent   Transfers   Sit to Stand 7  Independent   Stand to Sit 7  Independent   Toilet transfer 7  Independent   Functional Mobility   Functional Mobility 7  Independent Additional Comments Pt  ambulated around room, to and from bathroom with no a d  HR noted to be in 90s during toileting and ambulation  Balance   Static Sitting Normal   Dynamic Sitting Normal   Static Standing Normal   Dynamic Standing Good   Ambulatory Good   Activity Tolerance   Activity Tolerance Patient tolerated treatment well   RUE Assessment   RUE Assessment WFL   LUE Assessment   LUE Assessment WFL   Hand Function   Gross Motor Coordination Functional   Fine Motor Coordination Functional   Cognition   Overall Cognitive Status WFL   Arousal/Participation Alert; Cooperative   Attention Within functional limits   Orientation Level Oriented X4   Memory Within functional limits   Following Commands Follows all commands and directions without difficulty   Assessment   Prognosis Good   Assessment Pt is a [de-identified] y o  female seen for OT evaluation s/p admit to [facility] on 8/3/2019 w/ Near syncope  Comorbidities affecting pt's functional performance at time of assessment include: See above  Personal factors affecting pt at time of IE include:steps to enter environment, limited home support, difficulty performing ADLS and difficulty performing IADLS   Prior to admission, pt was I with ADLs  /IADLs and functional mobility with no a d  Janeth Sole Upon evaluation: Pt is I with ADLs and functional mobility; functioning at baseline at this time  No need for OT services at this time  PT to continue to follow pt to maintain mobility  From OT standpoint, recommendation at time of d/c would be home independent with continued family support     Additional Treatment Session   Start Time 0830   End Time 0845   Additional Treatment Day 1   Recommendation   OT Discharge Recommendation Home independent   Barthel Index   Feeding 10   Bathing 5   Grooming Score 5   Dressing Score 10   Bladder Score 10   Bowels Score 10   Toilet Use Score 10   Transfers (Bed/Chair) Score 15   Mobility (Level Surface) Score 15   Stairs Score 10   Barthel Index Score 100     Pt  OOB in bedside chair at end of session with needs in reach, lines intact       Hima Soliman, OT

## 2019-08-04 NOTE — PLAN OF CARE
Problem: Potential for Falls  Goal: Patient will remain free of falls  Description  INTERVENTIONS:  - Assess patient frequently for physical needs  -  Identify cognitive and physical deficits and behaviors that affect risk of falls  -  [place] fall precautions as indicated by assessment   - Educate patient/family on patient safety including physical limitations  - Instruct patient to call for assistance with activity based on assessment  - Modify environment to reduce risk of injury  - Consider OT/PT consult to assist with strengthening/mobility  Outcome: Progressing     Problem: CARDIOVASCULAR - ADULT  Goal: Maintains optimal cardiac output and hemodynamic stability  Description  INTERVENTIONS:  - Monitor I/O, vital signs and rhythm  - Monitor for S/S and trends of decreased cardiac output i e  bleeding, hypotension  - Administer and titrate ordered vasoactive medications to optimize hemodynamic stability  - Assess quality of pulses, skin color and temperature  - Assess for signs of decreased coronary artery perfusion - ex   Angina  - Instruct patient to report change in severity of symptoms  Outcome: Progressing  Goal: Absence of cardiac dysrhythmias or at baseline rhythm  Description  INTERVENTIONS:  - Continuous cardiac monitoring, monitor vital signs, obtain 12 lead EKG if indicated  - Administer antiarrhythmic and heart rate control medications as ordered  - Monitor electrolytes and administer replacement therapy as ordered  Outcome: Progressing     Problem: GASTROINTESTINAL - ADULT  Goal: Minimal or absence of nausea and/or vomiting  Description  INTERVENTIONS:  - Administer IV fluids as ordered to ensure adequate hydration  - Maintain NPO status until nausea and vomiting are resolved  - Nasogastric tube as ordered  - Administer ordered antiemetic medications as needed  - Provide nonpharmacologic comfort measures as appropriate  - Advance diet as tolerated, if ordered  - Nutrition services referral to assist patient with adequate nutrition and appropriate food choices  Outcome: Progressing  Goal: Maintains or returns to baseline bowel function  Description  INTERVENTIONS:  - Assess bowel function  - Encourage oral fluids to ensure adequate hydration  - Administer IV fluids as ordered to ensure adequate hydration  - Administer ordered medications as needed  - Encourage mobilization and activity  - Nutrition services referral to assist patient with appropriate food choices  Outcome: Progressing  Goal: Maintains adequate nutritional intake  Description  INTERVENTIONS:  - Monitor percentage of each meal consumed  - Identify factors contributing to decreased intake, treat as appropriate  - Assist with meals as needed  - Monitor I&O, WT and lab values  - Obtain nutrition services referral as needed  Outcome: Progressing  Goal: Establish and maintain optimal ostomy function  Description  INTERVENTIONS:  - Assess bowel function  - Encourage oral fluids to ensure adequate hydration  - Administer IV fluids as ordered to ensure adequate hydration  - Administer ordered medications as needed  - Encourage mobilization and activity  - Nutrition services referral to assist patient with appropriate food choices  - Assess stoma site  Outcome: Progressing     Problem: HEMATOLOGIC - ADULT  Goal: Maintains hematologic stability  Description  INTERVENTIONS  - Assess for signs and symptoms of bleeding or hemorrhage  - Monitor labs  - Administer supportive blood products/factors as ordered and appropriate  Outcome: Progressing

## 2019-08-04 NOTE — PHYSICAL THERAPY NOTE
PHYSICAL THERAPY NOTE      Patient Name: Dina Alexis  BDCCV'O Date: 8/4/2019 08/04/19 0845   Note Type   Note type Eval/Treat   Pain Assessment   Pain Assessment No/denies pain   Home Living   Additional Comments See OT   Prior Function   Comments See OT   Restrictions/Precautions   Other Precautions Chair Alarm; Bed Alarm; Fall Risk;Multiple lines   General   Family/Caregiver Present No   Cognition   Overall Cognitive Status WFL   Orientation Level Oriented X4   RLE Assessment   RLE Assessment WFL   LLE Assessment   LLE Assessment WFL   Coordination   Movements are Fluid and Coordinated 1   Bed Mobility   Rolling L 7  Independent   Supine to Sit 7  Independent   Transfers   Sit to Stand 7  Independent   Stand to Sit 7  Independent   Ambulation/Elevation   Gait pattern WNL   Gait Assistance 7  Independent   Distance 300 ft   Stair Management Assistance   (Deferred at eval, feeling heart palpitations  Vitals stable)   Balance   Static Sitting Normal   Dynamic Sitting Normal   Static Standing Normal   Dynamic Standing Good   Ambulatory Good  (113/70, HR 80s-100s)   Endurance Deficit   Endurance Deficit Yes   Activity Tolerance   Activity Tolerance Patient limited by fatigue   Nurse Made Aware Nsg aware   Assessment   Prognosis Good   Problem List Decreased strength;Decreased endurance; Impaired balance;Decreased mobility   Assessment Pt is [de-identified] y o  female seen for PT evaluation on 8/4/19 s/p admit to [facility] on 8/3/19 w/ near syncope  PT consulted to assess pt's functional mobility and d/c needs  Order placed for PT eval and tx  Performed at least 2 patient identifiers during session: Name and wristband  Comorbidities affecting pt's physical performance at time of assessment include: palpitations, dizziness, nausea, SVT  LOF prior to admission was independent with ADLs and IADLs   Personal factors affecting pt at time of IE include: LE weakness, palpitations, decreased endurance  Please find objective findings from PT assessment regarding body systems outlined above with impairments and limitations including weakness, impaired balance, decreased endurance, pain, decreased activity tolerance and fall risk, as well as mobility assessment  Pt's clinical presentation is currently unstable/unpredictable seen in pt's presentation of ongoing medical assessment  Given co-morbidities and presented presentation, high level eval was completed  Pt to benefit from continued PT tx to address deficits as defined above and maximize level of functional independent mobility and consistency  From PT/mobility standpoint, recommendation at time of d/c would be home vs no PT pending progress with inpatient PT in order to promote return to PLOF and independence  Goals   Patient Goals To get some sleep   STG Expiration Date 08/11/19   Short Term Goal #1 Gait independent without AD for 500+ feet   Short Term Goal #2 Stairs FF with single HR and mod I   Treatment Day 1   Plan   PT Frequency   (3-5 times per week)   Recommendation   Recommendation Defer at this time   PT - OK to Discharge Yes     No SCDs in place at start of session, none placed at end of session  Alarm in place on chair, all lines intact and needs in reach

## 2019-08-04 NOTE — ASSESSMENT & PLAN NOTE
Blood pressure initially elevated upon arrival to the ER  Currently stable  Continue home medications

## 2019-08-04 NOTE — ASSESSMENT & PLAN NOTE
She reports that she had onset of palpitations  She also reports having episodes of SVT in the past  EKG shows-Sinus rhythm with PACs at rate of 94 bpm  Telemetry monitoring  Trend troponin X 3 sets  Repeat EKG if new onset of Palpitations, or elevate troponin  Consider ECHO when available  Consider inpatient Cardiology consult when available

## 2019-08-04 NOTE — ED PROVIDER NOTES
History  Chief Complaint   Patient presents with    Palpitations     pt c/o palpitations, nausea, and vomiting since 1300 today  pt does have SVT history  Patient is an 21-year-old female  She does have a history of SVT  She reports that at 1:00 p m  Today she started feel dizzy  She reports that she felt as if she was a drunken  and was having difficulty walking  She denied any spinning, but reported lightheadedness  May be near syncopal   It was associated with nausea, vomiting and diarrhea  She also reported frequent urination  She had some headache  She denies any auditory complaints  No tinnitus  Any focal weakness or paralysis  No numbness or paresthesias  No speech problems  No visual complaints  She denied any abdominal pain  No fever or chills  She was experiencing palpitations  She reports that her heart was flip-flopping  She felt it in her neck  Not really any chest pain or shortness of breath  She did have some pain behind her left shoulder  There was no pleuritic pain  Symptoms are somewhat improved now but patient is still complaining of feeling dizzy  No aggravating or relieving factors  Symptoms were severe  Prior to Admission Medications   Prescriptions Last Dose Informant Patient Reported? Taking?    cholecalciferol (VITAMIN D3) 1,000 units tablet   Yes Yes   Sig: Take 1,000 Units by mouth daily   co-enzyme Q-10 30 MG capsule   Yes Yes   Sig: Take 30 mg by mouth daily   levothyroxine 100 mcg tablet   Yes Yes   Sig: Take 100 mcg by mouth daily   linaCLOtide (LINZESS PO)   Yes Yes   Sig: Take 1 each by mouth daily   magnesium oxide (MAG-OX) 400 mg   Yes Yes   Sig: Take 400 mg by mouth once   metoprolol succinate (TOPROL-XL) 50 mg 24 hr tablet   Yes Yes   Sig: Take 50 mg by mouth daily   saccharomyces boulardii (FLORASTOR) 250 mg capsule   Yes Yes   Sig: Take 250 mg by mouth 2 (two) times a day   traZODone (DESYREL) 50 mg tablet   Yes Yes   Sig: Take 50 mg by mouth daily at bedtime      Facility-Administered Medications: None       Past Medical History:   Diagnosis Date    Hyperlipidemia     Hypertension        Past Surgical History:   Procedure Laterality Date    CHOLECYSTECTOMY      TOTAL HIP ARTHROPLASTY         History reviewed  No pertinent family history  I have reviewed and agree with the history as documented  Social History     Tobacco Use    Smoking status: Never Smoker    Smokeless tobacco: Never Used   Substance Use Topics    Alcohol use: Never     Frequency: Never    Drug use: Never        Review of Systems   Constitutional: Negative for chills and fever  HENT: Negative for rhinorrhea and sore throat  Eyes: Negative for pain, redness and visual disturbance  Respiratory: Negative for cough and shortness of breath  Cardiovascular: Positive for palpitations  Negative for chest pain and leg swelling  Gastrointestinal: Positive for diarrhea, nausea and vomiting  Negative for abdominal pain  Endocrine: Positive for polyuria  Negative for polydipsia  Genitourinary: Negative for dysuria, frequency, hematuria, vaginal bleeding and vaginal discharge  Musculoskeletal: Negative for back pain and neck pain  Skin: Negative for rash and wound  Allergic/Immunologic: Negative for immunocompromised state  Neurological: Positive for headaches  Negative for speech difficulty, weakness and numbness  Hematological: Does not bruise/bleed easily  Psychiatric/Behavioral: Negative for hallucinations and suicidal ideas  All other systems reviewed and are negative  Physical Exam  Physical Exam   Constitutional: She is oriented to person, place, and time  She appears well-developed and well-nourished  No distress  HENT:   Head: Normocephalic and atraumatic  Mouth/Throat: Oropharynx is clear and moist    Tympanic membranes are normal    Eyes: Pupils are equal, round, and reactive to light   Conjunctivae and EOM are normal  Right eye exhibits no discharge  Left eye exhibits no discharge  No scleral icterus  No nystagmus  Neck: Normal range of motion  Neck supple  Cardiovascular: Normal rate, regular rhythm, normal heart sounds and intact distal pulses  Exam reveals no gallop and no friction rub  No murmur heard  Pulmonary/Chest: Effort normal and breath sounds normal  No stridor  No respiratory distress  She has no wheezes  She has no rales  Abdominal: Soft  Bowel sounds are normal  She exhibits no distension  There is no tenderness  There is no rebound and no guarding  Musculoskeletal: Normal range of motion  She exhibits no edema, tenderness or deformity  Neurological: She is alert and oriented to person, place, and time  She has normal strength  No cranial nerve deficit or sensory deficit  GCS eye subscore is 4  GCS verbal subscore is 5  GCS motor subscore is 6  Finger to nose testing is normal   There is no ataxia  Skin: Skin is warm and dry  No rash noted  She is not diaphoretic  Psychiatric: She has a normal mood and affect  Her behavior is normal    Vitals reviewed        Vital Signs  ED Triage Vitals [08/03/19 1927]   Temperature Pulse Respirations Blood Pressure SpO2   98 1 °F (36 7 °C) (!) 106 22 (!) 184/82 97 %      Temp Source Heart Rate Source Patient Position - Orthostatic VS BP Location FiO2 (%)   Temporal Monitor Lying Right arm --      Pain Score       No Pain           Vitals:    08/03/19 1927 08/03/19 2000 08/03/19 2100 08/03/19 2200   BP: (!) 184/82 170/73 140/74 130/60   Pulse: (!) 106 95 91 98   Patient Position - Orthostatic VS: Lying            Visual Acuity      ED Medications  Medications   meclizine (ANTIVERT) tablet 25 mg (25 mg Oral Given 8/3/19 2000)   sodium chloride 0 9 % bolus 500 mL (500 mL Intravenous New Bag 8/3/19 2001)   ondansetron (ZOFRAN) injection 4 mg (4 mg Intravenous Given 8/3/19 2002)       Diagnostic Studies  Results Reviewed     Procedure Component Value Units Date/Time    Urine Microscopic [[id number]]  (Normal) Collected:  08/03/19 2051    Lab Status:  Final result Specimen:  Urine, Clean Catch Updated:  08/03/19 2100     RBC, UA None Seen /hpf      WBC, UA None Seen /hpf      Epithelial Cells Occasional /hpf      Bacteria, UA Occasional /hpf     UA w Reflex to Microscopic w Reflex to Culture [[id number]]  (Abnormal) Collected:  08/03/19 2051    Lab Status:  Final result Specimen:  Urine, Clean Catch Updated:  08/03/19 2057     Color, UA Yellow     Clarity, UA Clear     Specific Gravity, UA 1 015     pH, UA 7 5     Leukocytes, UA Negative     Nitrite, UA Negative     Protein, UA Trace mg/dl      Glucose, UA Negative mg/dl      Ketones, UA >=80 (3+) mg/dl      Urobilinogen, UA 0 2 E U /dl      Bilirubin, UA Negative     Blood, UA Negative    TSH [[id number]]  (Normal) Collected:  08/03/19 1956    Lab Status:  Final result Specimen:  Blood from Arm, Left Updated:  08/03/19 2028     TSH 3RD GENERATON 0 597 uIU/mL     Narrative:       Patients undergoing fluorescein dye angiography may retain small amounts of fluorescein in the body for 48-72 hours post procedure  Samples containing fluorescein can produce falsely depressed TSH values  If the patient had this procedure,a specimen should be resubmitted post fluorescein clearance        Phosphorus [[id number]]  (Normal) Collected:  08/03/19 1956    Lab Status:  Final result Specimen:  Blood from Arm, Left Updated:  08/03/19 2028     Phosphorus 2 6 mg/dL     Magnesium [[id number]]  (Normal) Collected:  08/03/19 1956    Lab Status:  Final result Specimen:  Blood from Arm, Left Updated:  08/03/19 2028     Magnesium 2 0 mg/dL     Troponin I [[id number]]  (Normal) Collected:  08/03/19 1956    Lab Status:  Final result Specimen:  Blood from Arm, Left Updated:  08/03/19 2023     Troponin I <0 02 ng/mL     Comprehensive metabolic panel [[id number]]  (Abnormal) Collected:  08/03/19 1956    Lab Status:  Final result Specimen:  Blood from Arm, Left Updated: 08/03/19 2022     Sodium 136 mmol/L      Potassium 3 6 mmol/L      Chloride 99 mmol/L      CO2 22 mmol/L      ANION GAP 15 mmol/L      BUN 19 mg/dL      Creatinine 0 71 mg/dL      Glucose 133 mg/dL      Calcium 8 7 mg/dL      AST 16 U/L      ALT 19 U/L      Alkaline Phosphatase 41 U/L      Total Protein 6 9 g/dL      Albumin 3 8 g/dL      Total Bilirubin 0 50 mg/dL      eGFR 81 ml/min/1 73sq m     Narrative:       [place] guidelines for Chronic Kidney Disease (CKD):     Stage 1 with normal or high GFR (GFR > 90 mL/min/1 73 square meters)    Stage 2 Mild CKD (GFR = 60-89 mL/min/1 73 square meters)    Stage 3A Moderate CKD (GFR = 45-59 mL/min/1 73 square meters)    Stage 3B Moderate CKD (GFR = 30-44 mL/min/1 73 square meters)    Stage 4 Severe CKD (GFR = 15-29 mL/min/1 73 square meters)    Stage 5 End Stage CKD (GFR <15 mL/min/1 73 square meters)  Note: GFR calculation is accurate only with a steady state creatinine    CBC and differential [[id number]] Collected:  08/03/19 1956    Lab Status:  Final result Specimen:  Blood from Arm, Left Updated:  08/03/19 2001     WBC 6 50 Thousand/uL      RBC 4 21 Million/uL      Hemoglobin 12 9 g/dL      Hematocrit 39 0 %      MCV 93 fL      MCH 30 6 pg      MCHC 33 1 g/dL      RDW 13 2 %      MPV 9 9 fL      Platelets 987 Thousands/uL      nRBC 0 /100 WBCs      Neutrophils Relative 75 %      Immat GRANS % 1 %      Lymphocytes Relative 20 %      Monocytes Relative 4 %      Eosinophils Relative 0 %      Basophils Relative 0 %      Neutrophils Absolute 4 93 Thousands/µL      Immature Grans Absolute 0 03 Thousand/uL      Lymphocytes Absolute 1 30 Thousands/µL      Monocytes Absolute 0 23 Thousand/µL      Eosinophils Absolute 0 00 Thousand/µL      Basophils Absolute 0 01 Thousands/µL                  CT head without contrast   Final Result by Yumiko Robb DO (08/03 2054)      No acute intracranial abnormality                    Workstation performed: [id number]         XR chest 2 views   ED Interpretation by Isidro Cardona MD (08/03 2132)   No cardiomegaly  No infiltrates  No congestive heart failure  Procedures  ECG 12 Lead Documentation Only  Date/Time: 8/3/2019 8:11 PM  Performed by: Isidro Cardona MD  Authorized by: Isidro Cardona MD     ECG reviewed by me, the ED Provider: yes    Interpretation:     Interpretation: non-specific    Comments:      Normal sinus rhythm with premature atrial contractions  Low-voltage QRS  Poor R-wave progression  No acute ischemic ST or T-wave changes  ECG 12 Lead Documentation Only  Date/Time: 8/3/2019 9:33 PM  Performed by: Isidro Cardona MD  Authorized by: Isidro Cardona MD     ECG reviewed by me, the ED Provider: yes    Comments:      Repeat EKG shows normal sinus rhythm at 96 beats per minute  First-degree AV block  Low-voltage QRS  No acute ischemic ST or T-wave changes  No arrythmia  ED Course  ED Course as of Aug 03 2247   Sat Aug 03, 2019   2131 XR chest 2 views   2131 XR chest 2 views                               MDM  Number of Diagnoses or Management Options  Diagnosis management comments: ED workup was nonspecific  Head CT was negative  There was no pneumonia or cardiomegaly on the chest x-ray  No congestive heart failure  Laboratory evaluation was nonspecific  At times a sounds like vertigo, but patient does not describe any spinning  She continues to have dizziness despite treatment in the emergency room  Her cerebellar exam is normal   She has a normal gait  She was ambulated to the bathroom  Nevertheless, I believe the dizziness requires admission for further evaluation and treatment  Rule out cerebellar ischemia  Rule out cardiac causes of dizziness  Patient does have a history of SVT  However, we only really noted sinus tachycardia in the emergency room  She continues to have elevated heart rate in the emergency room    She continues to be nauseated  She has had vomiting despite Zofran  Consulted hospitalist for admission  Amount and/or Complexity of Data Reviewed  Clinical lab tests: ordered and reviewed  Tests in the radiology section of CPT®: ordered and reviewed  Discuss the patient with other providers: yes  Independent visualization of images, tracings, or specimens: yes        Disposition  Final diagnoses:   Palpitations   Dizziness   Nausea and vomiting     Time reflects when diagnosis was documented in both MDM as applicable and the Disposition within this note     Time User Action Codes Description Comment    8/3/2019 10:45 PM Rolin Ellen Add [R00 2] Palpitations     8/3/2019 10:45 PM Rolin Ellen Add [R42] Dizziness     8/3/2019 10:46 PM Rolin Ellen Add [R11 2] Nausea and vomiting       ED Disposition     ED Disposition Condition Date/Time Comment    Admit Stable Sat Aug 3, 2019 10:45 PM         Follow-up Information    None         Patient's Medications   Discharge Prescriptions    No medications on file     No discharge procedures on file      ED Provider  Electronically Signed by           Alexia Donaldson MD  08/03/19 4146

## 2019-08-04 NOTE — SPEECH THERAPY NOTE
Speech/Language Pathology Progress Note    Patient Name: Bhavani Boston  MSIXU'I Date: 8/4/2019     ST consult received, pt currently on a clear liquid diet  Will f/u tomorrow for evaluation as able

## 2019-08-04 NOTE — ASSESSMENT & PLAN NOTE
She report that she had onset of palpitations about 1 pm  Followed by onset of dizziness, nausea and vomiting  She said that she felt weak as though she was going to pass out  Head CT shows-No acute intracranial abnormality    No recent trauma,   Admit on observation  Telemetry monitoring  Orthostatic Vital signs  IV normal saline  Continue home medications  Monitor Vital signs  Check ECHO when available  Am labs  Supportive care

## 2019-08-04 NOTE — PHYSICIAN ADVISOR
Current patient class: Observation  The patient is currently on Hospital Day: 2 at [address]      The patient was admitted to the hospital at N/A on N/A for the following diagnosis:  Palpitations [R00 2]  Dizziness [R42]  Nausea and vomiting [R11 2]       There is documentation in the medical record of an expected length of stay of at least 2 midnights  The patient is therefore expected to satisfy the 2 midnight benchmark and given the 2 midnight presumption is appropriate for INPATIENT ADMISSION  Given this expectation of a satisfying stay, CMS instructs us that the patient is most often appropriate for inpatient admission under part A provided medical necessity is documented in the chart  After review of the relevant documentation, labs, vital signs and test results, the patient is appropriate for INPATIENT ADMISSION  Admission to the hospital as an inpatient is a complex decision making process which requires the practitioner to consider the patients presenting complaint, history and physical examination and all relevant testing  With this in mind, in this case, the patient was deemed appropriate for INPATIENT ADMISSION  After review of the documentation and testing available at the time of the admission I concur with this clinical determination of medical necessity  Rationale is as follows: The patient is a [de-identified] yrs old Female who presented to the ED at 8/3/2019  7:24 PM with a chief complaint of Palpitations (pt c/o palpitations, nausea, and vomiting since 1300 today  pt does have SVT history )     The patient is being admitted with near syncope, dizziness, palpitations  The plan of cafe includes telemetry monitoring, orthostatic vital signs, IVF, echo, repeat labs, prn meclizine, cardiology consultation  This patient is appropriate for INPATIENT admission       The patients vitals on arrival were ED Triage Vitals [08/03/19 1927]   Temperature Pulse Respirations Blood Pressure SpO2   98 1 °F (36 7 °C) (!) 106 22 (!) 184/82 97 %      Temp Source Heart Rate Source Patient Position - Orthostatic VS BP Location FiO2 (%)   Temporal Monitor Lying Right arm --      Pain Score       No Pain           Past Medical History:   Diagnosis Date    Hyperlipidemia     Hypertension      Past Surgical History:   Procedure Laterality Date    CHOLECYSTECTOMY      TOTAL HIP ARTHROPLASTY             Consults have been placed to:   IP CONSULT TO CASE MANAGEMENT  IP CONSULT TO CARDIOLOGY    Vitals:    08/03/19 2359 08/04/19 0001 08/04/19 0715 08/04/19 1519   BP: 147/75 156/79 141/72 151/83   BP Location:       Pulse: 93 94 72 74   Resp: 17 18 20 16   Temp: 98 3 °F (36 8 °C)  99 3 °F (37 4 °C) 98 2 °F (36 8 °C)   TempSrc:       SpO2: 97% 99% 99% 98%   Weight:       Height:           Most recent labs:    Recent Labs     08/04/19  0515 08/04/19  0635   WBC 6 98  --    HGB 12 1  --    HCT 36 6  --      --    K 3 7  --    CALCIUM 8 5  --    BUN 13  --    CREATININE 0 64  --    INR 1 00  --    TROPONINI 0 17* 0 17*   AST 19  --    ALT 17  --    ALKPHOS 44*  --        Scheduled Meds:  Current Facility-Administered Medications:  acetaminophen 650 mg Oral Q6H PRN Bennie Casper MD    aspirin 81 mg Oral Daily Bennie Casper MD    atorvastatin 20 mg Oral Daily With Autumn Johnson MD    cholecalciferol 1,000 Units Oral Daily Grady Borden PA-C    co-enzyme Q-10 30 mg Oral Daily Grady Borden PA-C    enoxaparin 40 mg Subcutaneous Daily Grady Borden PA-C    levothyroxine 100 mcg Oral Early Morning Grady Borden PA-C    metoprolol succinate 50 mg Oral Daily Grady Borden PA-C    ondansetron 4 mg Intravenous Q6H PRN Grady Borden PA-C    saccharomyces boulardii 250 mg Oral BID Grady Borden PA-C    sodium chloride 75 mL/hr Intravenous Continuous Grady Borden PA-C Last Rate: 75 mL/hr (08/04/19 1340)   traZODone 50 mg Oral HS Bennie Casper MD      Continuous Infusions:  sodium chloride 75 mL/hr Last Rate: 75 mL/hr (08/04/19 1340)     PRN Meds:   acetaminophen    ondansetron    Surgical procedures (if appropriate):

## 2019-08-04 NOTE — ASSESSMENT & PLAN NOTE
She reports onset of dizziness   Associated with nausea, vomiting, lightheadedness  She reports dizziness started after onset of palpitations  She received meclizine 25 mg PO in the ER   PRN meclizine   IV normal saline   Monitor for new onset of headache, or additional episodes of vomiting  Close monitoring

## 2019-08-05 ENCOUNTER — APPOINTMENT (INPATIENT)
Dept: NUCLEAR MEDICINE | Facility: HOSPITAL | Age: 79
DRG: 282 | End: 2019-08-05
Payer: MEDICARE

## 2019-08-05 ENCOUNTER — APPOINTMENT (INPATIENT)
Dept: NON INVASIVE DIAGNOSTICS | Facility: HOSPITAL | Age: 79
DRG: 282 | End: 2019-08-05
Payer: MEDICARE

## 2019-08-05 PROBLEM — R77.8 ELEVATED TROPONIN: Status: ACTIVE | Noted: 2019-08-05

## 2019-08-05 PROBLEM — R79.89 ELEVATED TROPONIN: Status: ACTIVE | Noted: 2019-08-05

## 2019-08-05 LAB
ANION GAP SERPL CALCULATED.3IONS-SCNC: 12 MMOL/L (ref 4–13)
ATRIAL RATE: 86 BPM
ATRIAL RATE: 94 BPM
BASOPHILS # BLD AUTO: 0.02 THOUSANDS/ΜL (ref 0–0.1)
BASOPHILS NFR BLD AUTO: 0 % (ref 0–1)
BUN SERPL-MCNC: 12 MG/DL (ref 5–25)
CALCIUM SERPL-MCNC: 8.6 MG/DL (ref 8.3–10.1)
CHEST PAIN STATEMENT: NORMAL
CHLORIDE SERPL-SCNC: 106 MMOL/L (ref 100–108)
CO2 SERPL-SCNC: 23 MMOL/L (ref 21–32)
CREAT SERPL-MCNC: 0.67 MG/DL (ref 0.6–1.3)
EOSINOPHIL # BLD AUTO: 0 THOUSAND/ΜL (ref 0–0.61)
EOSINOPHIL NFR BLD AUTO: 0 % (ref 0–6)
ERYTHROCYTE [DISTWIDTH] IN BLOOD BY AUTOMATED COUNT: 13.7 % (ref 11.6–15.1)
GFR SERPL CREATININE-BSD FRML MDRD: 83 ML/MIN/1.73SQ M
GLUCOSE SERPL-MCNC: 78 MG/DL (ref 65–140)
HCT VFR BLD AUTO: 33.5 % (ref 34.8–46.1)
HGB BLD-MCNC: 11 G/DL (ref 11.5–15.4)
IMM GRANULOCYTES # BLD AUTO: 0.02 THOUSAND/UL (ref 0–0.2)
IMM GRANULOCYTES NFR BLD AUTO: 0 % (ref 0–2)
LYMPHOCYTES # BLD AUTO: 1.65 THOUSANDS/ΜL (ref 0.6–4.47)
LYMPHOCYTES NFR BLD AUTO: 26 % (ref 14–44)
MAX DIASTOLIC BP: 74 MMHG
MAX HEART RATE: 164 BPM
MAX PREDICTED HEART RATE: 140 BPM
MAX. SYSTOLIC BP: 148 MMHG
MCH RBC QN AUTO: 31.3 PG (ref 26.8–34.3)
MCHC RBC AUTO-ENTMCNC: 32.8 G/DL (ref 31.4–37.4)
MCV RBC AUTO: 95 FL (ref 82–98)
MONOCYTES # BLD AUTO: 0.65 THOUSAND/ΜL (ref 0.17–1.22)
MONOCYTES NFR BLD AUTO: 10 % (ref 4–12)
NEUTROPHILS # BLD AUTO: 3.92 THOUSANDS/ΜL (ref 1.85–7.62)
NEUTS SEG NFR BLD AUTO: 64 % (ref 43–75)
NRBC BLD AUTO-RTO: 0 /100 WBCS
P AXIS: 53 DEGREES
P AXIS: 77 DEGREES
PLATELET # BLD AUTO: 202 THOUSANDS/UL (ref 149–390)
PMV BLD AUTO: 9.9 FL (ref 8.9–12.7)
POTASSIUM SERPL-SCNC: 3.7 MMOL/L (ref 3.5–5.3)
PR INTERVAL: 196 MS
PR INTERVAL: 206 MS
PROTOCOL NAME: NORMAL
QRS AXIS: -20 DEGREES
QRS AXIS: -25 DEGREES
QRSD INTERVAL: 74 MS
QRSD INTERVAL: 74 MS
QT INTERVAL: 352 MS
QT INTERVAL: 382 MS
QTC INTERVAL: 440 MS
QTC INTERVAL: 457 MS
RBC # BLD AUTO: 3.52 MILLION/UL (ref 3.81–5.12)
REASON FOR TERMINATION: NORMAL
SODIUM SERPL-SCNC: 141 MMOL/L (ref 136–145)
T WAVE AXIS: 55 DEGREES
T WAVE AXIS: 55 DEGREES
TARGET HR FORMULA: NORMAL
TEST INDICATION: NORMAL
TIME IN EXERCISE PHASE: NORMAL
TROPONIN I SERPL-MCNC: 0.45 NG/ML
VENTRICULAR RATE: 86 BPM
VENTRICULAR RATE: 94 BPM
WBC # BLD AUTO: 6.26 THOUSAND/UL (ref 4.31–10.16)

## 2019-08-05 PROCEDURE — 93010 ELECTROCARDIOGRAM REPORT: CPT | Performed by: INTERNAL MEDICINE

## 2019-08-05 PROCEDURE — 93306 TTE W/DOPPLER COMPLETE: CPT

## 2019-08-05 PROCEDURE — 85025 COMPLETE CBC W/AUTO DIFF WBC: CPT | Performed by: FAMILY MEDICINE

## 2019-08-05 PROCEDURE — 99232 SBSQ HOSP IP/OBS MODERATE 35: CPT | Performed by: FAMILY MEDICINE

## 2019-08-05 PROCEDURE — 93005 ELECTROCARDIOGRAM TRACING: CPT

## 2019-08-05 PROCEDURE — 99221 1ST HOSP IP/OBS SF/LOW 40: CPT | Performed by: INTERNAL MEDICINE

## 2019-08-05 PROCEDURE — A9502 TC99M TETROFOSMIN: HCPCS

## 2019-08-05 PROCEDURE — 78452 HT MUSCLE IMAGE SPECT MULT: CPT

## 2019-08-05 PROCEDURE — 93017 CV STRESS TEST TRACING ONLY: CPT

## 2019-08-05 PROCEDURE — 80048 BASIC METABOLIC PNL TOTAL CA: CPT | Performed by: FAMILY MEDICINE

## 2019-08-05 PROCEDURE — 93306 TTE W/DOPPLER COMPLETE: CPT | Performed by: INTERNAL MEDICINE

## 2019-08-05 PROCEDURE — 97116 GAIT TRAINING THERAPY: CPT

## 2019-08-05 PROCEDURE — 84484 ASSAY OF TROPONIN QUANT: CPT | Performed by: FAMILY MEDICINE

## 2019-08-05 RX ADMIN — Medication 30 MG: at 09:54

## 2019-08-05 RX ADMIN — ATORVASTATIN CALCIUM 20 MG: 10 TABLET, FILM COATED ORAL at 16:36

## 2019-08-05 RX ADMIN — ACETAMINOPHEN 650 MG: 325 TABLET, FILM COATED ORAL at 21:19

## 2019-08-05 RX ADMIN — Medication 250 MG: at 09:52

## 2019-08-05 RX ADMIN — ASPIRIN 81 MG 81 MG: 81 TABLET ORAL at 09:53

## 2019-08-05 RX ADMIN — ENOXAPARIN SODIUM 40 MG: 40 INJECTION SUBCUTANEOUS at 09:54

## 2019-08-05 RX ADMIN — SODIUM CHLORIDE 75 ML/HR: 0.9 INJECTION, SOLUTION INTRAVENOUS at 03:06

## 2019-08-05 RX ADMIN — REGADENOSON 0.4 MG: 0.08 INJECTION, SOLUTION INTRAVENOUS at 14:15

## 2019-08-05 RX ADMIN — MELATONIN 1000 UNITS: at 09:54

## 2019-08-05 RX ADMIN — METOPROLOL SUCCINATE 50 MG: 50 TABLET, EXTENDED RELEASE ORAL at 09:53

## 2019-08-05 RX ADMIN — Medication 250 MG: at 19:41

## 2019-08-05 NOTE — UTILIZATION REVIEW
OBS 8/3 @ 3579 UPGRADED TO INPATIENT 8/4 @ [address] PALPITATIONS    08/04/19 1729  Inpatient Admission Once     Transfer Service: Hospitalist       Question Answer Comment   Admitting Physician Matthew Roblero    Level of Care Med Surg    Estimated length of stay More than 2 Midnights    Certification I certify that inpatient services are medically necessary for this patient for a duration of greater than two midnights  See H&P and MD Progress Notes for additional information about the patient's course of treatment          08/04/19 5581

## 2019-08-05 NOTE — SPEECH THERAPY NOTE
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

## 2019-08-05 NOTE — SOCIAL WORK
Met with pt to discuss role as  in helping pt to develop discharge plan and to help pt carry out their plan  Pt lives in a house with her   Pt has 13 [address]  Pt is able to live on the main level   Pt has a shower chair and a walker at home    Pt uses no device to ambulate  Pt is independent with ADL's  Pt does the cooking and cleaning  Pt's  drives  Pt has never had home care or any services in the home  Pt's PCP is Dr Claudean Sicilian  Pt uses [facility]  Pt was given a discharge check list and Meds to [facility] Papers  Both reviewed with a good understanding  Will continue to follow for any case management needs

## 2019-08-05 NOTE — CONSULTS
Consultation - Cardiology   Cristpoher Blount [de-identified] y o  female MRN: [id number]  Unit/Bed#: 404-01 Encounter: [id number]    Consults      Physician Requesting Consult: Olvin Otero MD  Reason for Consult / Principal Problem: palpitations, elevated troponin, history of supraventricular tachycardia    Assessment:  1  Palpitations  2  Near syncope  3  History of supraventricular tachycardia  4  Type II myocardial infarction  5  Hypertension  6  Hyperlipidemia     Plan:   She likely had an episode of prolonged SVT causing her symptoms of palpitations, lightheadedness, and near syncope which was exacerbated by concurrent vomiting and diarrhea  I would regard her elevated troponin to be a type 2 myocardial infarction setting of presumed SVT  She did have mild ST depressions on EKG this morning  She denies any history of coronary artery disease or prior heart attacks  She denies any chest pain or further left shoulder pain  Recommendations:   1  Check an echocardiogram  2  Check a lexiscan myoview this afternoon  3  Increase metoprolol succinate to 75 mg daily  4  Lipid panel reviewed -   Agree with starting lipitor 20 mg daily  5  Outpatient f/u with Dr Kimberly Briceno upon discharge to discuss further treatment options/possible event monitor  History of Present Illness   HPI: Cristopher Blount is a [de-identified]y o  year old female who has a history of hypertension, hyperlipidemia, and paroxysmal SVT who follows with cardiologist Dr Kimberly Briceno  Patient presented to the emergency department on Saturday, August 3rd for the evaluation of lightheadedness and palpitations  Patient states that at 1:00 p m  That day she began to feel lightheaded to the point that she thought she was going to pass out  She also reports that her heart was flip-flopping in her chest   She had associated nausea, vomiting, and diarrhea  No chest pain or shortness of breath although she felt the need to take a deep breath in    She states that she did have associated left shoulder blade/shoulder discomfort  He does carry history of SVT and states that she would get episodes once every month or 2 and typically they did not last very long  However, she states the sensation of her heart flip-flopping lasted at least 6 hours on Saturday  She reports that it was constant in intensity  She also had a more intense episode than usual a few days prior as well  Typically she states she is very active and is able to climb up and down flight of stairs without any chest pain or shortness of breath  She states these more frequent episodes have made her feel more fatigued  In the emergency department she had chest x-ray showing no acute cardiopulmonary abnormality  CT head was negative for stroke  Blood pressure was quite elevated upon admission at 184/82  Her EKG showed sinus rhythm with PACs  Her initial troponin was negative in is now minimally elevated at 0 45  Currently she offers no complaints besides generalized fatigue  She did feel palpitations this morning  No more associated lightheadedness  She denies any recurrent left shoulder pain  Review of Systems   Constitution: Positive for malaise/fatigue  Negative for chills and fever  HENT: Negative  Cardiovascular: Negative for chest pain, dyspnea on exertion, leg swelling, near-syncope, orthopnea, palpitations, paroxysmal nocturnal dyspnea and syncope  Respiratory: Negative for cough and shortness of breath  Musculoskeletal: Positive for arthritis  Gastrointestinal: Negative for diarrhea, nausea and vomiting  Genitourinary: Negative  Neurological: Positive for light-headedness  Negative for dizziness  Psychiatric/Behavioral: Negative        Historical Information   Past Medical History:   Diagnosis Date    Hyperlipidemia     Hypertension      Past Surgical History:   Procedure Laterality Date    CHOLECYSTECTOMY      TOTAL HIP ARTHROPLASTY       Social History Substance and Sexual Activity   Alcohol Use Never    Alcohol/week: 0 0 standard drinks    Frequency: Never    Drinks per session: Patient refused    Binge frequency: Never    Comment: n/a     Social History     Substance and Sexual Activity   Drug Use Never     Social History     Tobacco Use   Smoking Status Never Smoker   Smokeless Tobacco Never Used     Family History: non-contributory    Meds/Allergies   all current active meds have been reviewed    sodium chloride 75 mL/hr Last Rate: 75 mL/hr (08/05/19 0306)       Allergies   Allergen Reactions    Penicillins Anaphylaxis    Vicodin [Hydrocodone-Acetaminophen] Vomiting       Objective   Vitals: Blood pressure 142/76, pulse 66, temperature 99 6 °F (37 6 °C), resp  rate 18, height 4' 10" (1 473 m), weight 61 4 kg (135 lb 6 oz), SpO2 97 %  , Body mass index is 28 29 kg/m² , Orthostatic Blood Pressures      Most Recent Value   Blood Pressure  142/76 filed at 08/05/2019 0723   Patient Position - Orthostatic VS  Standing - Orthostatic VS filed at 08/04/2019 5336        Systolic (79GYW), GEF:377 , Min:142 , XUJ:202     Diastolic (25MUA), JTH:12, Min:75, Max:83      Intake/Output Summary (Last 24 hours) at 8/5/2019 1013  Last data filed at 8/5/2019 0306  Gross per 24 hour   Intake 2897 8 ml   Output    Net 2897 8 ml       Weight (last 2 days)     Date/Time   Weight    08/05/19 0600   61 4 (135 38)    08/03/19 1927   66 3 (146 17)              Invasive Devices     Peripheral Intravenous Line            Peripheral IV 08/05/19 Dorsal (posterior); Left Wrist less than 1 day                  Physical Exam   Constitutional: She is oriented to person, place, and time  No distress  HENT:   Head: Normocephalic and atraumatic  Eyes: Pupils are equal, round, and reactive to light  Neck: Normal range of motion  Carotid bruit is not present  Cardiovascular: Normal rate, regular rhythm, S1 normal and S2 normal    No murmur heard    Pulses:       Radial pulses are 2+ on the right side, and 2+ on the left side  Dorsalis pedis pulses are 2+ on the right side, and 2+ on the left side  Pulmonary/Chest: Effort normal and breath sounds normal  No respiratory distress  She has no wheezes  She has no rales  Musculoskeletal: She exhibits no edema  Neurological: She is alert and oriented to person, place, and time  Skin: Skin is warm and dry  She is not diaphoretic  No erythema  Psychiatric: She has a normal mood and affect  Her behavior is normal    Vitals reviewed  Laboratory Results:  Results from last 7 days   Lab Units 08/05/19 0514 08/04/19 0635 08/04/19 0515   TROPONIN I ng/mL 0 45* 0 17* 0 17*       CBC with diff: Results from last 7 days   Lab Units 08/05/19 0514 08/04/19 0515 08/04/19 0057 08/03/19 1956   WBC Thousand/uL 6 26 6 98  --  6 50   HEMOGLOBIN g/dL 11 0* 12 1  --  12 9   HEMATOCRIT % 33 5* 36 6  --  39 0   MCV fL 95 94  --  93   PLATELETS Thousands/uL 202 215 221 251   MCH pg 31 3 31 1  --  30 6   MCHC g/dL 32 8 33 1  --  33 1   RDW % 13 7 13 4  --  13 2   MPV fL 9 9 9 9 9 9 9 9   NRBC AUTO /100 WBCs 0  --   --  0         CMP:  Results from last 7 days   Lab Units 08/05/19 0514 08/04/19 0515 08/03/19 1956   POTASSIUM mmol/L 3 7 3 7 3 6   CHLORIDE mmol/L 106 100 99*   CO2 mmol/L 23 24 22   BUN mg/dL 12 13 19   CREATININE mg/dL 0 67 0 64 0 71   CALCIUM mg/dL 8 6 8 5 8 7   AST U/L  --  19 16   ALT U/L  --  17 19   ALK PHOS U/L  --  44* 41*   EGFR ml/min/1 73sq m 83 85 81         BMP:  Results from last 7 days   Lab Units 08/05/19 0514 08/04/19 0515 08/03/19 1956   POTASSIUM mmol/L 3 7 3 7 3 6   CHLORIDE mmol/L 106 100 99*   CO2 mmol/L 23 24 22   BUN mg/dL 12 13 19   CREATININE mg/dL 0 67 0 64 0 71   CALCIUM mg/dL 8 6 8 5 8 7       BNP:  No results for input(s): BNP in the last 72 hours      Magnesium:   Results from last 7 days   Lab Units 08/04/19  0515 08/03/19  1956   MAGNESIUM mg/dL 2 1 2 0       Coags:   Results from last 7 days Lab Units 08/04/19  0515   PTT seconds 32   INR  1 00       TSH:       Hemoglobin A1C   Results from last 7 days   Lab Units 08/04/19  0057   HEMOGLOBIN A1C % 5 5       Lipid Profile:   Results from last 7 days   Lab Units 08/04/19  0057   TRIGLYCERIDES mg/dL 42   HDL mg/dL 70*       Cardiac testing:   No results found for this or any previous visit  No results found for this or any previous visit  No results found for this or any previous visit  No results found for this or any previous visit  Imaging: I have personally reviewed pertinent reports  Xr Chest 2 Views    Result Date: 8/4/2019  Narrative: CHEST INDICATION:   palpitations  COMPARISON:  None EXAM PERFORMED/VIEWS:  XR CHEST PA & LATERAL Images: 2 FINDINGS: Cardiomediastinal silhouette appears unremarkable  The lungs are clear  No pneumothorax or pleural effusion  Osseous structures appear within normal limits for patient age  Degenerative changes in the thoracic spine with scoliosis and convexity to the right side    Impression: No acute consolidation congestion Workstation performed: [id number]     Ct Head Without Contrast    Result Date: 8/3/2019  Narrative: CT BRAIN - WITHOUT CONTRAST INDICATION:  Headache, dizzy  COMPARISON:  None TECHNIQUE:  CT examination of the brain was performed  In addition to axial images, coronal 2D reformatted images were created and submitted for interpretation  Radiation dose length product (DLP) for this visit:  865 3 mGy-cm   This examination, like all CT scans performed in the [facility], was performed utilizing techniques to minimize radiation dose exposure, including the use of iterative reconstruction and automated exposure control  IMAGE QUALITY:  Diagnostic  FINDINGS: PARENCHYMA:  No intracranial mass, mass effect or midline shift  No CT signs of acute infarction  No acute parenchymal hemorrhage  Age-related cortical atrophy   Periventricular white matter hypodensity which is nonspecific although most compatible with chronic small vessel ischemic disease  VENTRICLES AND EXTRA-AXIAL SPACES:  Normal for the patient's age  VISUALIZED ORBITS AND PARANASAL SINUSES:  Unremarkable  CALVARIUM AND EXTRACRANIAL SOFT TISSUES:  Normal      Impression: No acute intracranial abnormality   Workstation performed: [id number]       EKG reviewed personally: normal sinus rhythm, ST depressions noted in lateral leads  Telemetry reviewed personally:     Assessment:  Principal Problem:    Near syncope  Active Problems:    Essential hypertension    Other hyperlipidemia    Palpitations    Dizziness    Other specified hypothyroidism        Code Status: Level 3 - DNAR and DNI

## 2019-08-05 NOTE — PLAN OF CARE
Problem: PHYSICAL THERAPY ADULT  Goal: Performs mobility at highest level of function for planned discharge setting  See evaluation for individualized goals  Description             See flowsheet documentation for full assessment, interventions and recommendations  Outcome: Progressing  Note:   Prognosis: Good  Problem List: Decreased strength, Decreased endurance, Impaired balance, Decreased mobility  Assessment: Pt reports feeling unsteady gait training without device  Pt initially gait trained with IV pole for support  Pt then reported that she felt safer walking in her shoes  Pt returned to room to don shoes  PT also obtained [facility] AND [place] per pt report  Pt gait trained 900' with [facility] AND [place]  Pt with inconsistent sequencing with cane  Pt also gait trained approximately 50' while carrying quad cane, but not using it  Pt reports that she used a quad cane in the past  After gait training with the wide based quad cane, the pt reports that she would prefer to try a narrow base quad cane  Pt also with fast gait speed causing WBQC to be unsteadying  Will attempt gait with NBQC  Pt will benefit from continued PT to progress gait, transfers, balance, strengthening, steps, and safety in order to maximize function and decrease risk for falls and prepare pt for return to home  Recommendation: Home independently     PT - OK to Discharge: Yes(When medically stable)    See flowsheet documentation for full assessment

## 2019-08-05 NOTE — PHYSICAL THERAPY NOTE
PT PROGRESS NOTE     08/05/19 0851   Pain Assessment   Pain Assessment No/denies pain   Cognition   Overall Cognitive Status WFL   Arousal/Participation Alert   Attention Within functional limits   Orientation Level Oriented X4   Memory Within functional limits   Following Commands Follows multistep commands with increased time or repetition   Subjective   Subjective Pt motivated to walk  Reports SOB with increased activity  Prefers gait in shoes  Transfers   Sit to Stand 5  Supervision   Stand to Sit 5  Supervision   Ambulation/Elevation   Gait pattern   (reciprocal)   Gait Assistance 5  Supervision   Assistive Device   (IV pole and [facility])   Distance   (900')   Stair Management Assistance 5  Supervision   Stair Management Technique   (R rail to ascend, L rail to descend)   Number of Stairs   (5)   Balance   Static Sitting Normal   Dynamic Sitting Normal   Static Standing Good   Dynamic Standing Fair   Ambulatory Fair   Activity Tolerance   Activity Tolerance Patient tolerated treatment well  (Intermittent standing rest breaks required during gait )   Assessment   Prognosis Good   Problem List Decreased strength;Decreased endurance; Impaired balance;Decreased mobility   Assessment Pt reports feeling unsteady gait training without device  Pt initially gait trained with IV pole for support  Pt then reported that she felt safer walking in her shoes  Pt returned to room to don shoes  PT also obtained [facility] per pt report  Pt gait trained 900' with [facility]  Pt with inconsistent sequencing with cane  Pt also gait trained approximately 50' while carrying quad cane, but not using it  Pt reports that she used a quad cane in the past  After gait training with the wide based quad cane, the pt reports that she would prefer to try a narrow base quad cane  Pt also with fast gait speed causing WBQC to be unsteadying  Will attempt gait with NBQC    Pt will benefit from continued PT to progress gait, transfers, balance, strengthening, steps, and safety in order to maximize function and decrease risk for falls and prepare pt for return to home  Goals   Treatment Day 2   Plan   Treatment/Interventions Functional transfer training;LE strengthening/ROM; Elevations; Therapeutic exercise; Endurance training;Patient/family training;Equipment eval/education; Bed mobility;Gait training   Progress Progressing toward goals   Recommendation   Recommendation Home independently   Equipment Recommended   (NBQC)   PT - OK to Discharge Yes  (When medically stable)   Pt OOB in chair  LEs elevated  Call bell in reach

## 2019-08-05 NOTE — ASSESSMENT & PLAN NOTE
She reports that she had onset of palpitations  She also reports having episodes of SVT in the past  Case discussed with cardiology, Stress test today

## 2019-08-05 NOTE — PROGRESS NOTES
Progress Note - Darshan Vora 1940, [de-identified] y o  female MRN: [id number]    Unit/Bed#: 404-01 Encounter: [id number]    Primary Care Provider: Dariana Jiang DO   Date and time admitted to hospital: 8/3/2019  7:24 PM        Elevated troponin  Assessment & Plan  Troponin peaked at 0 45  Cardiology input appreciated  Patient will undergo stress test today    Palpitations  Assessment & Plan  She reports that she had onset of palpitations  She also reports having episodes of SVT in the past  Case discussed with cardiology, Stress test today     Other specified hypothyroidism  Assessment & Plan  Continue levothyroxine    Other hyperlipidemia  Assessment & Plan  Started Lipitor    Essential hypertension  Assessment & Plan  Blood pressure control on current antihypertensive regimen        Progress Note - Darshan Vora [de-identified] y o  female MRN: [id number]    Unit/Bed#: 404-01 Encounter: [id number]        Subjective:   Patient seen examined at bedside, she has no acute complaints, specifically or nausea/vomiting/chest pain/shortness of breath    Objective:     Vitals:   Vitals:    08/05/19 0723   BP: 142/76   Pulse: 66   Resp: 18   Temp: 99 6 °F (37 6 °C)   SpO2: 97%     Body mass index is 28 29 kg/m²      Intake/Output Summary (Last 24 hours) at 8/5/2019 1127  Last data filed at 8/5/2019 0306  Gross per 24 hour   Intake 2897 8 ml   Output    Net 2897 8 ml       Physical Exam:   /76   Pulse 66   Temp 99 6 °F (37 6 °C)   Resp 18   Ht 4' 10" (1 473 m)   Wt 61 4 kg (135 lb 6 oz)   SpO2 97%   BMI 28 29 kg/m²   General appearance: alert and oriented, in no acute distress  Head: Normocephalic, without obvious abnormality, atraumatic  Lungs: clear to auscultation bilaterally  Heart: regular rate and rhythm, S1, S2 normal, no murmur, click, rub or gallop  Abdomen: soft, non-tender; bowel sounds normal; no masses,  no organomegaly  Extremities: extremities normal, warm and well-perfused; no cyanosis, clubbing, or edema  Lymph nodes: Cervical, supraclavicular, and axillary nodes normal   Neurologic: Grossly normal     Invasive Devices     Peripheral Intravenous Line            Peripheral IV 08/05/19 Dorsal (posterior); Left Wrist less than 1 day                Results from last 7 days   Lab Units 08/05/19  0514 08/04/19  0515 08/04/19  0057 08/03/19  1956   WBC Thousand/uL 6 26 6 98  --  6 50   HEMOGLOBIN g/dL 11 0* 12 1  --  12 9   HEMATOCRIT % 33 5* 36 6  --  39 0   PLATELETS Thousands/uL 202 215 221 251       Results from last 7 days   Lab Units 08/05/19  0514 08/04/19  0515 08/03/19  1956   POTASSIUM mmol/L 3 7 3 7 3 6   CHLORIDE mmol/L 106 100 99*   CO2 mmol/L 23 24 22   BUN mg/dL 12 13 19   CREATININE mg/dL 0 67 0 64 0 71   CALCIUM mg/dL 8 6 8 5 8 7   ALK PHOS U/L  --  44* 41*   ALT U/L  --  17 19   AST U/L  --  19 16       Medication Administration - last 24 hours from 08/04/2019 1127 to 08/05/2019 1127       Date/Time Order Dose Route Action Action by     08/05/2019 0954 cholecalciferol (VITAMIN D3) tablet 1,000 Units 1,000 Units Oral Given LANCE Eagle     08/05/2019 0954 co-enzyme Q-10 capsule 30 mg 30 mg Oral Given LANCE Eagle     08/05/2019 6468 levothyroxine tablet 100 mcg 100 mcg Oral 1015 [facility], RN     08/05/2019 9691 metoprolol succinate (TOPROL-XL) 24 hr tablet 50 mg 50 mg Oral Given LANCE Eagle     08/05/2019 7812 saccharomyces boulardii (FLORASTOR) capsule 250 mg 250 mg Oral Given LANCE Eagle     08/04/2019 1710 saccharomyces boulardii (FLORASTOR) capsule 250 mg 250 mg Oral Given Pam Snyder RN     08/05/2019 0306 sodium chloride 0 9 % infusion 75 mL/hr Intravenous 909 [facility], [address] [place] [address]     08/04/2019 1340 sodium chloride 0 9 % infusion 75 mL/hr Intravenous New Bag Pam Snyder RN     08/05/2019 0954 enoxaparin (LOVENOX) subcutaneous injection 40 mg 40 mg Subcutaneous Given LANCE Eagle     08/04/2019 1710 atorvastatin (LIPITOR) tablet 20 mg 20 mg Oral Given Rosey Aguayo RN     08/05/2019 0832 aspirin chewable tablet 81 mg 81 mg Oral Given LANCE Eagle     08/04/2019 2203 acetaminophen (TYLENOL) tablet 650 mg 650 mg Oral Given Mariaa Sanabria RN     08/04/2019 1338 acetaminophen (TYLENOL) tablet 650 mg 650 mg Oral Given Pam Snyder RN     08/04/2019 2203 traZODone (DESYREL) tablet 50 mg 50 mg Oral Given Mariaa Sanabria RN            Lab, Imaging and other studies: I have personally reviewed pertinent reports      VTE Pharmacologic Prophylaxis: Enoxaparin (Lovenox)  VTE Mechanical Prophylaxis: sequential compression device     Olvin Otero MD  8/5/2019,11:27 AM

## 2019-08-06 VITALS
TEMPERATURE: 97.9 F | HEIGHT: 58 IN | BODY MASS INDEX: 28.21 KG/M2 | OXYGEN SATURATION: 98 % | HEART RATE: 75 BPM | DIASTOLIC BLOOD PRESSURE: 67 MMHG | SYSTOLIC BLOOD PRESSURE: 136 MMHG | RESPIRATION RATE: 18 BRPM | WEIGHT: 134.4 LBS

## 2019-08-06 PROBLEM — R55 NEAR SYNCOPE: Status: RESOLVED | Noted: 2019-08-04 | Resolved: 2019-08-06

## 2019-08-06 PROBLEM — R42 DIZZINESS: Status: RESOLVED | Noted: 2019-08-03 | Resolved: 2019-08-06

## 2019-08-06 PROBLEM — I21.4 NSTEMI, INITIAL EPISODE OF CARE (HCC): Status: ACTIVE | Noted: 2019-08-05

## 2019-08-06 PROBLEM — I47.1 PAROXYSMAL SVT (SUPRAVENTRICULAR TACHYCARDIA) (HCC): Chronic | Status: ACTIVE | Noted: 2019-08-06

## 2019-08-06 PROBLEM — I47.10 PAROXYSMAL SVT (SUPRAVENTRICULAR TACHYCARDIA): Chronic | Status: ACTIVE | Noted: 2019-08-06

## 2019-08-06 PROBLEM — R00.2 PALPITATIONS: Status: RESOLVED | Noted: 2019-08-03 | Resolved: 2019-08-06

## 2019-08-06 LAB
ATRIAL RATE: 66 BPM
ATRIAL RATE: 96 BPM
P AXIS: 54 DEGREES
P AXIS: 56 DEGREES
PR INTERVAL: 200 MS
PR INTERVAL: 224 MS
QRS AXIS: -22 DEGREES
QRS AXIS: -29 DEGREES
QRSD INTERVAL: 72 MS
QRSD INTERVAL: 74 MS
QT INTERVAL: 352 MS
QT INTERVAL: 418 MS
QTC INTERVAL: 438 MS
QTC INTERVAL: 444 MS
T WAVE AXIS: 38 DEGREES
T WAVE AXIS: 39 DEGREES
TROPONIN I SERPL-MCNC: 0.16 NG/ML
VENTRICULAR RATE: 66 BPM
VENTRICULAR RATE: 96 BPM

## 2019-08-06 PROCEDURE — 99232 SBSQ HOSP IP/OBS MODERATE 35: CPT | Performed by: INTERNAL MEDICINE

## 2019-08-06 PROCEDURE — 97530 THERAPEUTIC ACTIVITIES: CPT

## 2019-08-06 PROCEDURE — 93010 ELECTROCARDIOGRAM REPORT: CPT | Performed by: INTERNAL MEDICINE

## 2019-08-06 PROCEDURE — 78452 HT MUSCLE IMAGE SPECT MULT: CPT | Performed by: INTERNAL MEDICINE

## 2019-08-06 PROCEDURE — 93005 ELECTROCARDIOGRAM TRACING: CPT

## 2019-08-06 PROCEDURE — 97116 GAIT TRAINING THERAPY: CPT

## 2019-08-06 PROCEDURE — 93016 CV STRESS TEST SUPVJ ONLY: CPT | Performed by: INTERNAL MEDICINE

## 2019-08-06 PROCEDURE — 93018 CV STRESS TEST I&R ONLY: CPT | Performed by: INTERNAL MEDICINE

## 2019-08-06 PROCEDURE — 84484 ASSAY OF TROPONIN QUANT: CPT | Performed by: NURSE PRACTITIONER

## 2019-08-06 PROCEDURE — 99238 HOSP IP/OBS DSCHRG MGMT 30/<: CPT | Performed by: FAMILY MEDICINE

## 2019-08-06 RX ORDER — ATORVASTATIN CALCIUM 20 MG/1
20 TABLET, FILM COATED ORAL
Qty: 30 TABLET | Refills: 0 | Status: SHIPPED | OUTPATIENT
Start: 2019-08-06 | End: 2019-08-07 | Stop reason: SDUPTHER

## 2019-08-06 RX ORDER — METOPROLOL SUCCINATE 50 MG/1
75 TABLET, EXTENDED RELEASE ORAL DAILY
Qty: 45 TABLET | Refills: 0 | Status: SHIPPED | OUTPATIENT
Start: 2019-08-06 | End: 2019-08-07 | Stop reason: SDUPTHER

## 2019-08-06 RX ADMIN — ENOXAPARIN SODIUM 40 MG: 40 INJECTION SUBCUTANEOUS at 08:43

## 2019-08-06 RX ADMIN — ATORVASTATIN CALCIUM 20 MG: 10 TABLET, FILM COATED ORAL at 16:04

## 2019-08-06 RX ADMIN — ACETAMINOPHEN 650 MG: 325 TABLET, FILM COATED ORAL at 05:15

## 2019-08-06 RX ADMIN — ASPIRIN 81 MG 81 MG: 81 TABLET ORAL at 08:43

## 2019-08-06 RX ADMIN — SODIUM CHLORIDE 75 ML/HR: 0.9 INJECTION, SOLUTION INTRAVENOUS at 08:44

## 2019-08-06 RX ADMIN — Medication 30 MG: at 10:37

## 2019-08-06 RX ADMIN — METOPROLOL SUCCINATE 50 MG: 50 TABLET, EXTENDED RELEASE ORAL at 08:43

## 2019-08-06 RX ADMIN — Medication 250 MG: at 08:43

## 2019-08-06 RX ADMIN — MELATONIN 1000 UNITS: at 08:43

## 2019-08-06 NOTE — NURSING NOTE
Patient noted to have episode of SVT on telemetry monitoring   States she felt heart racing for a few moments while lying at rest    Notified Mike

## 2019-08-06 NOTE — PROGRESS NOTES
Cardiology Progress Note - Rosalina Tavera [de-identified] y o  female MRN: [id number]    Unit/Bed#: 404-01 Encounter: [id number]    Assessment:  1  Supraventricular tachycardia  2  Type II myocardial infarction  3  Hypertension  4  Hyperlipidemia     Plan:  Patient had documented SVT on telemetry monitoring overnight and this morning which correlated with her symptoms  Nuclear stress testing was negative for myocardial ischemia  Echocardiogram showed preserved systolic function with mild-to-moderate mitral regurgitation  Recommendations:  1  Increase metoprolol succinate to 75 mg daily (patient takes at night)  2  Continue statin  3  Outpatient f/u with primary cardiologist, Dr Sofi Elkins to discuss ablation vs  Medical management  Subjective:   Patient seen and examined  She had a few episodes of her heart "flip-flopping" overnight  She describes this as a sensation of her heart both "bouncing" as well as "vertically moving up and down " these episodes were not accompanied by lightheadedness or dizziness  no chest pain or shortness of breath  Review of Systems   Constitution: Negative for chills and fever  HENT: Negative  Cardiovascular: Positive for palpitations  Negative for chest pain, dyspnea on exertion, leg swelling, near-syncope, orthopnea, paroxysmal nocturnal dyspnea and syncope  Respiratory: Negative for cough and shortness of breath  Gastrointestinal: Negative for diarrhea, nausea and vomiting  Genitourinary: Negative  Neurological: Negative for dizziness and light-headedness  Objective:   Vitals: Blood pressure 136/67, pulse 75, temperature 97 9 °F (36 6 °C), resp  rate 18, height 4' 10" (1 473 m), weight 61 kg (134 lb 6 4 oz), SpO2 98 %  , Body mass index is 28 09 kg/m² , Orthostatic Blood Pressures      Most Recent Value   Blood Pressure  136/67 filed at 08/06/2019 6307   Patient Position - Orthostatic VS  Lying filed at 08/05/2019 8550         Systolic (25YCW), MFS:250 , Min:126 , MZK:399     Diastolic (77CGT), DQT:23, Min:56, Max:93      Intake/Output Summary (Last 24 hours) at 8/6/2019 1138  Last data filed at 8/5/2019 1730  Gross per 24 hour   Intake 240 ml   Output    Net 240 ml     Weight (last 2 days)     Date/Time   Weight    08/06/19 0600   61 (134 4)    08/05/19 0600   61 4 (135 38)                Telemetry Review: a few episodes of SVT  EKG personally reviewed by Loli Syed PA-C  Physical Exam   Constitutional: She is oriented to person, place, and time  No distress  HENT:   Head: Normocephalic and atraumatic  Eyes: Pupils are equal, round, and reactive to light  Neck: Normal range of motion  Carotid bruit is not present  Cardiovascular: Normal rate, S1 normal and S2 normal  An irregular rhythm present  No murmur heard  Pulses:       Radial pulses are 2+ on the right side, and 2+ on the left side  Dorsalis pedis pulses are 1+ on the right side, and 2+ on the left side  Pulmonary/Chest: Effort normal and breath sounds normal  No respiratory distress  She has no wheezes  She has no rales  Musculoskeletal: She exhibits no edema  Neurological: She is alert and oriented to person, place, and time  Skin: Skin is warm and dry  She is not diaphoretic  No erythema  Psychiatric: She has a normal mood and affect   Her behavior is normal          Laboratory Results:  Results from last 7 days   Lab Units 08/06/19  0015 08/05/19  0514 08/04/19  0635   TROPONIN I ng/mL 0 16* 0 45* 0 17*       CBC with diff: Results from last 7 days   Lab Units 08/05/19  0514 08/04/19  0515 08/04/19  0057 08/03/19  1956   WBC Thousand/uL 6 26 6 98  --  6 50   HEMOGLOBIN g/dL 11 0* 12 1  --  12 9   HEMATOCRIT % 33 5* 36 6  --  39 0   MCV fL 95 94  --  93   PLATELETS Thousands/uL 202 215 221 251   MCH pg 31 3 31 1  --  30 6   MCHC g/dL 32 8 33 1  --  33 1   RDW % 13 7 13 4  --  13 2   MPV fL 9 9 9 9 9 9 9 9   NRBC AUTO /100 WBCs 0  --   --  0         CMP:  Results from last 7 days Lab Units 1914 19  0515 19  195   POTASSIUM mmol/L 3 7 3 7 3 6   CHLORIDE mmol/L 106 100 99*   CO2 mmol/L 23 24 22   BUN mg/dL 12 13 19   CREATININE mg/dL 0 67 0 64 0 71   CALCIUM mg/dL 8 6 8 5 8 7   AST U/L  --  19 16   ALT U/L  --  17 19   ALK PHOS U/L  --  44* 41*   EGFR ml/min/1 73sq m 83 85 81         BMP:  Results from last 7 days   Lab Units 19  0514 19  0515 19   POTASSIUM mmol/L 3 7 3 7 3 6   CHLORIDE mmol/L 106 100 99*   CO2 mmol/L 23 24 22   BUN mg/dL 12 13 19   CREATININE mg/dL 0 67 0 64 0 71   CALCIUM mg/dL 8 6 8 5 8 7       BNP: No results for input(s): BNP in the last 72 hours      Magnesium:   Results from last 7 days   Lab Units 1915 19   MAGNESIUM mg/dL 2 1 2 0       Coags:   Results from last 7 days   Lab Units 19  0515   PTT seconds 32   INR  1 00       TSH:        Hemoglobin A1C   Results from last 7 days   Lab Units 19  0057   HEMOGLOBIN A1C % 5 5       Lipid Profile: Results from last 7 days   Lab Units 19  0057   TRIGLYCERIDES mg/dL 42   HDL mg/dL 70*       Cardiac testing:   Results for orders placed during the hospital encounter of 19   Echo complete with contrast if indicated    Narrative [address] [facility] [address] [facility] [address], [facility]  [phone]    Transthoracic Echocardiogram  2D, M-mode, Doppler, and Color Doppler    Study date:  05-Aug-2019    Patient: Hector Reed  MR number: [id number]  Account number: [de-identified]  : 1940  Age: [de-identified] years  Gender: Female  Status: Inpatient  Location: Bedside  Height: 58 in  Weight: 146 1 lb  BP: 145/ 75 mmHg    Indications: palpitations    Diagnoses: 785 1 - PALPITATIONS    Sonographer:  JOHN Ruiz  Referring Physician:  Syl Carreno DO  Group:  [facility]  Interpreting Physician:  Jazzy Zaidi MD    SUMMARY    LEFT VENTRICLE:  Size was normal   Systolic function was normal  Ejection fraction was estimated to be 60 %  There were no regional wall motion abnormalities  Wall thickness was normal     MITRAL VALVE:  There was mild to moderate regurgitation  TRICUSPID VALVE:  There was mild regurgitation  PROCEDURE: The procedure was performed at the bedside  This was a routine study  The transthoracic approach was used  The study included complete 2D imaging, M-mode, complete spectral Doppler, and color Doppler  The heart rate was 80 bpm,  at the start of the study  This was a technically difficult study  LEFT VENTRICLE: Size was normal  Systolic function was normal  Ejection fraction was estimated to be 60 %  There were no regional wall motion abnormalities  Wall thickness was normal     RIGHT VENTRICLE: The size was normal  Systolic function was normal  Wall thickness was normal     LEFT ATRIUM: Size was normal     RIGHT ATRIUM: Size was normal     MITRAL VALVE: Valve structure was normal  There was normal leaflet separation  DOPPLER: The transmitral velocity was within the normal range  There was no evidence for stenosis  There was mild to moderate regurgitation  AORTIC VALVE: The valve was trileaflet  Leaflets exhibited normal thickness and normal cuspal separation  DOPPLER: Transaortic velocity was within the normal range  There was no evidence for stenosis  There was no regurgitation  TRICUSPID VALVE: The valve structure was normal  There was normal leaflet separation  DOPPLER: The transtricuspid velocity was within the normal range  There was no evidence for stenosis  There was mild regurgitation  PULMONIC VALVE: Leaflets exhibited normal thickness, no calcification, and normal cuspal separation  DOPPLER: The transpulmonic velocity was within the normal range  There was no regurgitation  PERICARDIUM: There was no pericardial effusion  The pericardium was normal in appearance  AORTA: The root exhibited normal size      SYSTEM MEASUREMENT TABLES    2D  %FS: 31 81 %  Ao Diam: 3 02 cm  EDV(Teich): 46 62 ml  EF(Teich): 61 05 %  ESV(Teich): 18 16 ml  IVSd: 0 91 cm  LA Area: 10 99 cm2  LA Diam: 3 49 cm  LVEDV MOD A4C: 51 56 ml  LVEF MOD A4C: 61 58 %  LVESV MOD A4C: 19 81 ml  LVIDd: 3 38 cm  LVIDs: 2 3 cm  LVLd A4C: 7 34 cm  LVLs A4C: 5 94 cm  LVPWd: 0 87 cm  RA Area: 10 64 cm2  RWT: 0 52  SV MOD A4C: 31 75 ml  SV(Teich): 28 46 ml    CW  TR Vmax: 2 74 m/s  TR maxP 12 mmHg    MM  TAPSE: 2 66 cm    PW  E' Sept: 0 1 m/s  E/E' Sept: 9 23  MV A Sriram: 0 97 m/s  MV Dec Tama: 4 43 m/s2  MV DecT: 204 76 ms  MV E Sriram: 0 91 m/s  MV E/A Ratio: 0 94    [employer] Accredited Echocardiography Laboratory    Prepared and electronically signed by    Marbella Howard MD  Signed 05-Aug-2019 10:15:53       No results found for this or any previous visit  No results found for this or any previous visit  No results found for this or any previous visit      Meds/Allergies   all current active meds have been reviewed  Medications Prior to Admission   Medication    cholecalciferol (VITAMIN D3) 1,000 units tablet    co-enzyme Q-10 30 MG capsule    levothyroxine 100 mcg tablet    linaCLOtide (LINZESS PO)    magnesium oxide (MAG-OX) 400 mg    metoprolol succinate (TOPROL-XL) 50 mg 24 hr tablet    saccharomyces boulardii (FLORASTOR) 250 mg capsule    traZODone (DESYREL) 50 mg tablet         sodium chloride 75 mL/hr Last Rate: 75 mL/hr (19 0844)     Assessment:  Principal Problem:    Near syncope  Active Problems:    Essential hypertension    Other hyperlipidemia    Palpitations    Dizziness    Other specified hypothyroidism    Elevated troponin

## 2019-08-06 NOTE — PLAN OF CARE
Problem: PHYSICAL THERAPY ADULT  Goal: Performs mobility at highest level of function for planned discharge setting  See evaluation for individualized goals  Description             See flowsheet documentation for full assessment, interventions and recommendations  Outcome: Progressing  Note:   Prognosis: Good  Problem List: Impaired balance, Decreased strength, Decreased mobility  Assessment: Pt  found resting in bed this AM but agreeable to participate with PT treatment  Pt  transfered from supine to sit I'ly, and from sit to stand I'ly  Pt  expressed that the therapist that worked with her on Friday was going to try a [employer] [facility] with her but she has not seen her since  The only quad cane available on the unit was [facility] and she stated that she tried that Friday and it was too big and clumbsy  A note was left for the PT to see if there are any in the facility to try and if it is safe the pt  would like one for home if covered by insurace  Pt  agreed to ambulate with a RW until the other AD is trialed  Pt  ambulated 600 ft with RW I'ly only needing assistance to manipulate her IV pole  When pt  entered the room following the walk she reported the need to toilet  Her toilet transfers were I as well clothing management and post hygiene care, again, only needing help with the IV pole  Pt  tolerated treatment exceptionally well but would benifit from further treatment to trial a SBQC  She reports the [facility] to [facility] and she goes to the [facility] to care for her loved ones graves and needs something with just a bit more support due to the uneven surfaces  Pt  left in room with all needs in reach  Pt  denied pain and fatigue throughout treatment  Recommendation: Home PT     PT - OK to Discharge: Yes    See flowsheet documentation for full assessment     Estefania Dickson, PTA

## 2019-08-06 NOTE — ASSESSMENT & PLAN NOTE
Patient presented for palpitations and presyncopal symptoms of lightheadedness and nausea  Admitted for evaluation and treatment of above  Symptoms subsequently improved during hospitalization  Cardiology consulted; appreciate support  Serial troponin measured and peaked at 0 45 before declining to 0 16  Nuclear medicine stress test completed and showed no ischemia, either electrically or symptomatically  Toprol XL increased from 50 mg to 75 mg daily  Started atorvastatin 20 mg daily  Follow-up with Cardiology as outpatient

## 2019-08-06 NOTE — ASSESSMENT & PLAN NOTE
Patient has history of paroxysmal SVT  Unclear, but prolonged episode of SVT may have instigated patient's symptoms and prior to presentation at ER  No sustained SVT on telemetry during admission, several brief episodes and patient continued to experience symptoms  Will increase Toprol-XL from 50 mg to 75 mg daily  Follow-up with Cardiology as outpatient

## 2019-08-06 NOTE — DISCHARGE SUMMARY
Discharge- Kaya Paniagua 1940, [de-identified] y o  female MRN: [id number]    Unit/Bed#: 404-01 Encounter: [id number]    Primary Care Provider: Maria Esther Mcallister DO   Date and time admitted to hospital: 8/3/2019  7:24 PM        * NSTEMI, initial episode of care [facility])  Assessment & Plan  Patient presented for palpitations and presyncopal symptoms of lightheadedness and nausea  Admitted for evaluation and treatment of above  Symptoms subsequently improved during hospitalization  Cardiology consulted; appreciate support  Serial troponin measured and peaked at 0 45 before declining to 0 16  Nuclear medicine stress test completed and showed no ischemia, either electrically or symptomatically  Toprol XL increased from 50 mg to 75 mg daily  Started atorvastatin 20 mg daily  Follow-up with Cardiology as outpatient  Paroxysmal SVT (supraventricular tachycardia) [facility])  Assessment & Plan  Patient has history of paroxysmal SVT  Unclear, but prolonged episode of SVT may have instigated patient's symptoms and prior to presentation at ER  No sustained SVT on telemetry during admission, several brief episodes and patient continued to experience symptoms  Will increase Toprol-XL from 50 mg to 75 mg daily  Follow-up with Cardiology as outpatient  Other specified hypothyroidism  Assessment & Plan  TSH in therapeutic range at 0 6  Continue levothyroxine at current dose and frequency  Other hyperlipidemia  Assessment & Plan  Elevated LDL in setting of new NSTEMI  Started atorvastatin during this hospitalization  Follow-up with Cardiology as outpatient  Essential hypertension  Assessment & Plan  Chronic stable condition  Blood pressure controlled on current antihypertensive regimen  However, patient experiencing recurrent palpitations  Will increase Toprol XL from 50 mg to 75 mg daily  Follow-up with Cardiology as outpatient                    Resolved Problems  Date Reviewed: 8/6/2019          Resolved Palpitations 8/6/2019     Resolved by  Mina Morales MD    Dizziness 8/6/2019     Resolved by  Mina Morales MD    Near syncope 8/6/2019     Resolved by  Mina Morales MD          Admission Date:   Admission Orders (From admission, onward)     Ordered        08/04/19 1729  Inpatient Admission  Once         08/03/19 2247  Place in Observation (expected length of stay for this patient is less than two midnights)  Once                     Admitting Diagnosis: Palpitations [R00 2]  Dizziness [R42]  Nausea and vomiting [R11 2]    HPI: Mayra Wilson is a [de-identified] y o  female who presents to the ER with complaints of palpitations starting about 1 pm yesterday followed by episodes of dizziness, nausea and vomiting  She denies chest pan or discomfort, headaches, fever, chills, abdominal pain, visual disturbances, numbness, tingling  She has a history of hypertension, hyperlipidemia and hypothyroidism  She also reports that she has had episodes of SVT in the past   However EKG upon arrival in emergency room showed sinus rhythm with PACs at a rate of 94 beats per minute  She reports that episode of palpitation felt similar to when she was diagnosed with SVT  She states that the palpitations subsided however a dizziness nausea and vomiting continued prompted her to come to the emergency room  Procedures Performed:   Orders Placed This Encounter   Procedures    ED ECG Documentation Only    ED ECG Documentation Only       Summary of Hospital Course:  See above    Significant Findings, Care, Treatment and Services Provided:  See above    Complications:  None    Condition at Discharge: good         Discharge instructions/Information to patient and family:   See after visit summary for information provided to patient and family  Provisions for Follow-Up Care:  See after visit summary for information related to follow-up care and any pertinent home health orders        PCP: Brian Colon DO    Disposition: Home    Planned Readmission: No    Discharge Statement   I spent 30 minutes discharging the patient  This time was spent on the day of discharge  I had direct contact with the patient on the day of discharge  Additional documentation is required if more than 30 minutes were spent on discharge  Discharge Medications:  See after visit summary for reconciled discharge medications provided to patient and family

## 2019-08-06 NOTE — SOCIAL WORK
Pt is being discharged today  Pt's  will give the patient a ride home  Case Management reviewed discharge planning process including the following: identifying help that is needed at home, pt's preference for discharge needs and Meds at [facility]  Reviewed with Pt that any member of the healthcare team can answer questions regarding : medications, jmportance of recognizing  Signs and symptoms of any  medical problems  Case Management also encouraged pt to follow up with all recommended appointments after discharge

## 2019-08-06 NOTE — ASSESSMENT & PLAN NOTE
Chronic stable condition  Blood pressure controlled on current antihypertensive regimen  However, patient experiencing recurrent palpitations  Will increase Toprol XL from 50 mg to 75 mg daily  Follow-up with Cardiology as outpatient

## 2019-08-06 NOTE — PLAN OF CARE
Problem: PHYSICAL THERAPY ADULT  Goal: Performs mobility at highest level of function for planned discharge setting  See evaluation for individualized goals  Description             See flowsheet documentation for full assessment, interventions and recommendations  8/6/2019 1529 by Trudi Primrose, PTA  Outcome: Progressing  Note:   Prognosis: Good  Problem List: Impaired balance, Decreased strength, Decreased mobility  Assessment: Pt  seen this PM to trial a SBQC  AD was properly fitted to the patient  Pt  instructed on proper use and informed that Insurance should cover the AD as long as they did not issue her another AD within the last five years  Pt  ambulated safely with the Device  Pt  plans to be D/C'ed to home tonight or tomorrow  Pt  signed paperwork for the Quad cane and SBQC was issued to the patient and PT obtained a script for the device  Recommendation: Outpatient PT     PT - OK to Discharge: Yes    See flowsheet documentation for full assessment  8/6/2019 1142 by Trudi Primrose, PTA  Outcome: Progressing  Note:   Prognosis: Good  Problem List: Impaired balance, Decreased strength, Decreased mobility  Assessment: Pt  found resting in bed this AM but agreeable to participate with PT treatment  Pt  transfered from supine to sit I'ly, and from sit to stand I'ly  Pt  expressed that the therapist that worked with her on Friday was [facility] her but she has not seen her since  The only quad cane available on the unit was [facility] and she stated that she tried that Friday and it was too big and clumbsy  A note was left for the PT to see if there are any in the facility to try and if it is safe the pt  would like one for home if covered by insurace  Pt  agreed to ambulate with a RW until the other AD is trialed  Pt  ambulated 600 ft with RW I'ly only needing assistance to manipulate her IV pole    When pt  entered the room following the walk she reported the need to toilet  Her toilet transfers were I as well clothing management and post hygiene care, again, only needing help with the IV pole  Pt  tolerated treatment exceptionally well but would benifit from further treatment to trial a SBQC  She reports the [facility] to [facility] and she goes to the [facility] to care for her loved ones graves and needs something with just a bit more support due to the uneven surfaces  Pt  left in room with all needs in reach  Pt  denied pain and fatigue throughout treatment  Recommendation: Home PT     PT - OK to Discharge: Yes    See flowsheet documentation for full assessment     Mathew Casanova, JORGE

## 2019-08-06 NOTE — PHYSICAL THERAPY NOTE
08/06/19 1011   Pain Assessment   Pain Assessment No/denies pain   Pain Score No Pain   Restrictions/Precautions   Weight Bearing Precautions Per Order Yes   General   Chart Reviewed Yes   Response to Previous Treatment Patient with no complaints from previous session  (Wants to try a SBQC, WBQC to big and clumbsy)   Family/Caregiver Present No   Cognition   Overall Cognitive Status WFL   Arousal/Participation Alert; Cooperative   Attention Within functional limits   Orientation Level Oriented X4   Memory Within functional limits   Following Commands Follows all commands and directions without difficulty   Subjective   Subjective " Someone told me they were going to bring a [employer] for me to try on Friday but I have not seen her since Friday, the [facility] [place] is to big and clumbsy for me "   Bed Mobility   Rolling R 7  Independent   Rolling L 7  Independent   Supine to Sit 7  Independent   Sit to Supine 7  Independent   Transfers   Sit to Stand 7  Independent   Stand to Sit 7  Independent   Toilet transfer 7  Independent   Additional items   (Assistance given to manipulate IV pole )   Ambulation/Elevation   Gait pattern WNL   Gait Assistance 7  Independent   Additional items   (assisted with IV pole)   Assistive Device Rolling walker  (would lkike to try an SBQC)   Distance 900 ft    Balance   Static Sitting Normal   Dynamic Sitting Normal   Static Standing Normal   Dynamic Standing Good   Ambulatory Good   Endurance Deficit   Endurance Deficit No   Activity Tolerance   Activity Tolerance Patient tolerated treatment well   Assessment   Prognosis Good   Problem List Impaired balance;Decreased strength;Decreased mobility   Assessment Pt  found resting in bed this AM but agreeable to participate with PT treatment  Pt  transfered from supine to sit I'ly, and from sit to stand I'ly  Pt  expressed that the therapist that worked with her on Friday was going to try a [employer] with her but she has not seen her since    The only quad cane available on the unit was a [facility] [place] and she stated that she tried that Friday and it was too big and clumbsy  A note was left for the PT to see if there are any in the facility to try and if it is safe the pt  would like one for home if covered by insurace  Pt  agreed to ambulate with a RW until the other AD is trialed  Pt  ambulated 600 ft with RW I'ly only needing assistance to manipulate her IV pole  When pt  entered the room following the walk she reported the need to toilet  Her toilet transfers were I as well clothing management and post hygiene care, again, only needing help with the IV pole  Pt  tolerated treatment exceptionally well but would benifit from further treatment to trial a SBQC  She reports the [facility] is to [facility] and she goes to the [facility] to care for her loved ones [place] and needs something with just a bit more support due to the uneven surfaces  Pt  left in room with all needs in reach  Pt  denied pain and fatigue throughout treatment  Goals   Patient Goals to try a [facility]   Treatment Day 2   Plan   Treatment/Interventions Functional transfer training;LE strengthening/ROM; Therapeutic exercise; Endurance training;Patient/family training;Bed mobility;Gait training   Progress Improving as expected   Recommendation   Recommendation Home PT   Equipment Recommended Other (Comment)  [facility]   PT - OK to Discharge Yes   Vianey Dickerson PTA

## 2019-08-06 NOTE — PHYSICAL THERAPY NOTE
08/06/19 1317   Pain Assessment   Pain Assessment No/denies pain   Pain Score No Pain   Restrictions/Precautions   Weight Bearing Precautions Per Order No   Other Precautions Chair Alarm; Bed Alarm; Fall Risk   General   Chart Reviewed Yes   Response to Previous Treatment Patient with no complaints from previous session  Family/Caregiver Present No   Cognition   Overall Cognitive Status WFL   Arousal/Participation Alert; Cooperative   Attention Within functional limits   Orientation Level Oriented X4   Memory Within functional limits   Following Commands Follows all commands and directions without difficulty   Subjective   Subjective "that AD is perfect for me"  Transfers   Sit to Stand 7  Independent   Stand to Sit 7  Independent   Ambulation/Elevation   Gait pattern WNL   Gait Assistance 7  Independent   Assistive Device Small base quad cane   Distance 50ft   Endurance Deficit   Endurance Deficit No   Activity Tolerance   Activity Tolerance Patient tolerated treatment well   Assessment   Prognosis Good   Problem List Impaired balance;Decreased strength;Decreased mobility   Assessment Pt  seen this PM to trial a SBQC  AD was properly fitted to the patient  Pt  instructed on proper use and informed that Insurance should cover the AD as long as they did not issue her another AD within the last five years  Pt  ambulated safely with the Device  Pt  plans to be D/C'ed to home tonight or tomorrow  Pt  signed paperwork for the Quad cane and SBQC was issued to the patient and PT obtained a script for the device  Goals   Patient Goals to go home   Treatment Day 2   Plan   Treatment/Interventions Functional transfer training;LE strengthening/ROM; Therapeutic exercise; Endurance training;Patient/family training;Bed mobility;Gait training;Equipment eval/education   Progress Improving as expected   PT Frequency   (3-5x/wk)   Recommendation   Recommendation Outpatient PT   PT - OK to Discharge Yes   Phillip Estrada Manuel Agudelo, PTA

## 2019-08-06 NOTE — ASSESSMENT & PLAN NOTE
Elevated LDL in setting of new NSTEMI  Started atorvastatin during this hospitalization  Follow-up with Cardiology as outpatient

## 2019-08-07 DIAGNOSIS — I21.4 NSTEMI, INITIAL EPISODE OF CARE (HCC): ICD-10-CM

## 2019-08-07 DIAGNOSIS — I10 ESSENTIAL HYPERTENSION: ICD-10-CM

## 2019-08-07 DIAGNOSIS — I47.1 PAROXYSMAL SVT (SUPRAVENTRICULAR TACHYCARDIA) (HCC): Chronic | ICD-10-CM

## 2019-08-07 DIAGNOSIS — E78.49 OTHER HYPERLIPIDEMIA: ICD-10-CM

## 2019-08-07 RX ORDER — ATORVASTATIN CALCIUM 20 MG/1
20 TABLET, FILM COATED ORAL
Qty: 30 TABLET | Refills: 0 | Status: SHIPPED | OUTPATIENT
Start: 2019-08-07 | End: 2021-05-14 | Stop reason: CLARIF

## 2019-08-07 RX ORDER — METOPROLOL SUCCINATE 50 MG/1
75 TABLET, EXTENDED RELEASE ORAL DAILY
Qty: 45 TABLET | Refills: 0 | Status: SHIPPED | OUTPATIENT
Start: 2019-08-07 | End: 2019-08-13 | Stop reason: SDUPTHER

## 2019-08-08 ENCOUNTER — EPISODE CHANGES (OUTPATIENT)
Dept: CASE MANAGEMENT | Facility: HOSPITAL | Age: 79
End: 2019-08-08

## 2019-08-09 ENCOUNTER — PATIENT OUTREACH (OUTPATIENT)
Dept: CASE MANAGEMENT | Facility: HOSPITAL | Age: 79
End: 2019-08-09

## 2019-08-13 ENCOUNTER — OFFICE VISIT (OUTPATIENT)
Dept: CARDIOLOGY CLINIC | Facility: CLINIC | Age: 79
End: 2019-08-13
Payer: MEDICARE

## 2019-08-13 VITALS
SYSTOLIC BLOOD PRESSURE: 140 MMHG | BODY MASS INDEX: 28.97 KG/M2 | WEIGHT: 138 LBS | HEART RATE: 60 BPM | HEIGHT: 58 IN | DIASTOLIC BLOOD PRESSURE: 90 MMHG

## 2019-08-13 DIAGNOSIS — I21.4 NSTEMI, INITIAL EPISODE OF CARE (HCC): ICD-10-CM

## 2019-08-13 DIAGNOSIS — I47.1 PAROXYSMAL SVT (SUPRAVENTRICULAR TACHYCARDIA) (HCC): Chronic | ICD-10-CM

## 2019-08-13 DIAGNOSIS — R00.2 PALPITATIONS: Primary | ICD-10-CM

## 2019-08-13 DIAGNOSIS — I10 ESSENTIAL HYPERTENSION: ICD-10-CM

## 2019-08-13 PROCEDURE — 1124F ACP DISCUSS-NO DSCNMKR DOCD: CPT | Performed by: INTERNAL MEDICINE

## 2019-08-13 PROCEDURE — 99213 OFFICE O/P EST LOW 20 MIN: CPT | Performed by: INTERNAL MEDICINE

## 2019-08-13 RX ORDER — METOPROLOL SUCCINATE 50 MG/1
75 TABLET, EXTENDED RELEASE ORAL DAILY
Qty: 135 TABLET | Refills: 5 | Status: SHIPPED | OUTPATIENT
Start: 2019-08-13 | End: 2020-10-19

## 2019-08-13 RX ORDER — ALPRAZOLAM 1 MG/1
TABLET ORAL
COMMUNITY

## 2019-08-13 NOTE — PROGRESS NOTES
Patient ID: Megan Willson is a [de-identified] y o  female  Plan:      Palpitations  Seems a bit better on higher dose of metoprolol and I will renew  Essential hypertension  Well controlled  Paroxysmal SVT (supraventricular tachycardia) (HCC)  Perhaps intermittently present  Not sustained  Follow up Plan:1 year ecg and f/u visit  HPI:   The patient Seen having recently been hospitalized at [facility]   It was unclear whether she had a true SVT event  In any event she was quite lightheaded  There was a small non ST elevation MI  Follow-up stress testing and echocardiogram were normal   Metoprolol dose was increased and she was asked to follow-up with me  Since hospital discharge she has had some fatigue but generally feels well  She notices some ectopic beats as before but nothing sustained  Most recent or relevant cardiac/vascular testing:      Echocardiogram 8/5/2019: Normal LV function  Mild to moderate mitral regurgitation  Myoview 8/5/2019: Normal       Past Surgical History:   Procedure Laterality Date    CHOLECYSTECTOMY      TOTAL HIP ARTHROPLASTY       CMP:   Lab Results   Component Value Date    K 3 7 08/05/2019     08/05/2019    CO2 23 08/05/2019    BUN 12 08/05/2019    CREATININE 0 67 08/05/2019    EGFR 83 08/05/2019       Lipid Profile:   Lab Results   Component Value Date    TRIG 42 08/04/2019    HDL 70 (H) 08/04/2019         Review of Systems   10  point ROS  was otherwise non pertinent or negative except as per HPI or as below  Gait: Slow but normal   At home she uses a cane  Objective:     /90   Pulse 60   Ht 4' 10" (1 473 m)   Wt 62 6 kg (138 lb)   BMI 28 84 kg/m²     PHYSICAL EXAM:    General:  Normal appearance in no distress  Eyes:  Anicteric  Oral mucosa:  Moist   Neck:  No JVD  Carotid upstrokes are brisk without bruits  No masses  Chest:  Clear to auscultation and percussion  Cardiac:  Normal PMI  Normal S1 and S2    No murmur gallop or rub  Abdomen:  Soft and nontender  No palpable organomegaly or aortic enlargement  Extremities:  No peripheral edema  Musculoskeletal:  Symmetric  Vascular:  Femoral pulses are brisk without bruits  Popliteal pulses are intact bilaterally  Pedal pulses are intact  Neuro:  Grossly symmetric  Psych:  Alert and oriented x3          Current Outpatient Medications:     ALPRAZolam (XANAX) 1 mg tablet, Take by mouth, Disp: , Rfl:     atorvastatin (LIPITOR) 20 mg tablet, Take 1 tablet (20 mg total) by mouth daily with dinner, Disp: 30 tablet, Rfl: 0    cholecalciferol (VITAMIN D3) 1,000 units tablet, Take 1,000 Units by mouth daily, Disp: , Rfl:     co-enzyme Q-10 30 MG capsule, Take 30 mg by mouth daily, Disp: , Rfl:     DEXILANT 60 MG capsule, Take 60 mg by mouth daily, Disp: , Rfl:     levothyroxine 112 mcg tablet, Take 112 mcg by mouth daily , Disp: , Rfl:     linaCLOtide (LINZESS) 145 MCG CAPS, Take 1 each by mouth daily , Disp: , Rfl:     magnesium oxide (MAG-OX) 400 mg, Take 400 mg by mouth once, Disp: , Rfl:     metoprolol succinate (TOPROL-XL) 50 mg 24 hr tablet, Take 1 5 tablets (75 mg total) by mouth daily, Disp: 135 tablet, Rfl: 5    saccharomyces boulardii (FLORASTOR) 250 mg capsule, Take 250 mg by mouth 2 (two) times a day, Disp: , Rfl:     traZODone (DESYREL) 50 mg tablet, Take 50 mg by mouth daily at bedtime, Disp: , Rfl:   Allergies   Allergen Reactions    Penicillins Anaphylaxis    Vicodin [Hydrocodone-Acetaminophen] Vomiting     Past Medical History:   Diagnosis Date    Hyperlipidemia     Hypertension     NSTEMI (non-ST elevated myocardial infarction) 08/03/2019    Paroxysmal SV[facility]supra[facility]ricular tachycardia) ([facility]) 8/6/2019           Social History     Tobacco Use   Smoking Status Never Smoker   Smokeless Tobacco Never Used

## 2019-10-18 ENCOUNTER — APPOINTMENT (OUTPATIENT)
Dept: LAB | Facility: MEDICAL CENTER | Age: 79
End: 2019-10-18
Payer: MEDICARE

## 2019-10-18 ENCOUNTER — TRANSCRIBE ORDERS (OUTPATIENT)
Dept: LAB | Facility: MEDICAL CENTER | Age: 79
End: 2019-10-18

## 2019-10-18 DIAGNOSIS — E78.5 HYPERLIPIDEMIA, UNSPECIFIED HYPERLIPIDEMIA TYPE: ICD-10-CM

## 2019-10-18 DIAGNOSIS — I10 HYPERTENSION, UNSPECIFIED TYPE: Primary | ICD-10-CM

## 2019-10-18 DIAGNOSIS — I10 HYPERTENSION, UNSPECIFIED TYPE: ICD-10-CM

## 2019-10-18 LAB
ALBUMIN SERPL BCP-MCNC: 4.1 G/DL (ref 3.5–5)
ALP SERPL-CCNC: 43 U/L (ref 46–116)
ALT SERPL W P-5'-P-CCNC: 18 U/L (ref 12–78)
ANION GAP SERPL CALCULATED.3IONS-SCNC: 6 MMOL/L (ref 4–13)
AST SERPL W P-5'-P-CCNC: 14 U/L (ref 5–45)
BILIRUB SERPL-MCNC: 0.34 MG/DL (ref 0.2–1)
BUN SERPL-MCNC: 11 MG/DL (ref 5–25)
CALCIUM SERPL-MCNC: 9.2 MG/DL (ref 8.3–10.1)
CHLORIDE SERPL-SCNC: 109 MMOL/L (ref 100–108)
CHOLEST SERPL-MCNC: 156 MG/DL (ref 50–200)
CO2 SERPL-SCNC: 29 MMOL/L (ref 21–32)
CREAT SERPL-MCNC: 0.78 MG/DL (ref 0.6–1.3)
ERYTHROCYTE [DISTWIDTH] IN BLOOD BY AUTOMATED COUNT: 14.2 % (ref 11.6–15.1)
GFR SERPL CREATININE-BSD FRML MDRD: 72 ML/MIN/1.73SQ M
GLUCOSE P FAST SERPL-MCNC: 83 MG/DL (ref 65–99)
HCT VFR BLD AUTO: 37.9 % (ref 34.8–46.1)
HDLC SERPL-MCNC: 64 MG/DL (ref 40–60)
HGB BLD-MCNC: 11.8 G/DL (ref 11.5–15.4)
LDLC SERPL CALC-MCNC: 72 MG/DL (ref 0–100)
MCH RBC QN AUTO: 30 PG (ref 26.8–34.3)
MCHC RBC AUTO-ENTMCNC: 31.1 G/DL (ref 31.4–37.4)
MCV RBC AUTO: 96 FL (ref 82–98)
NONHDLC SERPL-MCNC: 92 MG/DL
PLATELET # BLD AUTO: 203 THOUSANDS/UL (ref 149–390)
PMV BLD AUTO: 10.1 FL (ref 8.9–12.7)
POTASSIUM SERPL-SCNC: 4.5 MMOL/L (ref 3.5–5.3)
PROT SERPL-MCNC: 6.9 G/DL (ref 6.4–8.2)
RBC # BLD AUTO: 3.93 MILLION/UL (ref 3.81–5.12)
SODIUM SERPL-SCNC: 144 MMOL/L (ref 136–145)
TRIGL SERPL-MCNC: 101 MG/DL
WBC # BLD AUTO: 4.76 THOUSAND/UL (ref 4.31–10.16)

## 2019-10-18 PROCEDURE — 85027 COMPLETE CBC AUTOMATED: CPT

## 2019-10-18 PROCEDURE — 80061 LIPID PANEL: CPT

## 2019-10-18 PROCEDURE — 36415 COLL VENOUS BLD VENIPUNCTURE: CPT

## 2019-10-18 PROCEDURE — 80053 COMPREHEN METABOLIC PANEL: CPT

## 2020-10-19 DIAGNOSIS — I21.4 NSTEMI, INITIAL EPISODE OF CARE (HCC): ICD-10-CM

## 2020-10-19 DIAGNOSIS — I47.1 PAROXYSMAL SVT (SUPRAVENTRICULAR TACHYCARDIA) (HCC): Chronic | ICD-10-CM

## 2020-10-19 DIAGNOSIS — I10 ESSENTIAL HYPERTENSION: ICD-10-CM

## 2020-10-19 RX ORDER — METOPROLOL SUCCINATE 50 MG
TABLET, EXTENDED RELEASE 24 HR ORAL
Qty: 135 TABLET | Refills: 3 | Status: SHIPPED | OUTPATIENT
Start: 2020-10-19 | End: 2021-05-14 | Stop reason: DRUGHIGH

## 2020-11-17 ENCOUNTER — APPOINTMENT (OUTPATIENT)
Dept: RADIOLOGY | Facility: CLINIC | Age: 80
End: 2020-11-17
Payer: MEDICARE

## 2020-11-17 ENCOUNTER — OFFICE VISIT (OUTPATIENT)
Dept: OBGYN CLINIC | Facility: CLINIC | Age: 80
End: 2020-11-17
Payer: MEDICARE

## 2020-11-17 VITALS
WEIGHT: 123.8 LBS | HEART RATE: 67 BPM | TEMPERATURE: 98.2 F | BODY MASS INDEX: 25.98 KG/M2 | DIASTOLIC BLOOD PRESSURE: 93 MMHG | HEIGHT: 58 IN | SYSTOLIC BLOOD PRESSURE: 155 MMHG

## 2020-11-17 DIAGNOSIS — M47.812 OSTEOARTHRITIS OF CERVICAL SPINE, UNSPECIFIED SPINAL OSTEOARTHRITIS COMPLICATION STATUS: Primary | ICD-10-CM

## 2020-11-17 DIAGNOSIS — M47.812 OSTEOARTHRITIS OF CERVICAL SPINE, UNSPECIFIED SPINAL OSTEOARTHRITIS COMPLICATION STATUS: ICD-10-CM

## 2020-11-17 DIAGNOSIS — M54.12 RADICULOPATHY, CERVICAL REGION: ICD-10-CM

## 2020-11-17 PROCEDURE — 99213 OFFICE O/P EST LOW 20 MIN: CPT | Performed by: ORTHOPAEDIC SURGERY

## 2020-11-17 PROCEDURE — 72040 X-RAY EXAM NECK SPINE 2-3 VW: CPT

## 2020-11-30 ENCOUNTER — CONSULT (OUTPATIENT)
Dept: PAIN MEDICINE | Facility: CLINIC | Age: 80
End: 2020-11-30
Payer: MEDICARE

## 2020-11-30 ENCOUNTER — TELEPHONE (OUTPATIENT)
Dept: PAIN MEDICINE | Facility: CLINIC | Age: 80
End: 2020-11-30

## 2020-11-30 VITALS
WEIGHT: 123.2 LBS | BODY MASS INDEX: 25.86 KG/M2 | HEIGHT: 58 IN | DIASTOLIC BLOOD PRESSURE: 76 MMHG | SYSTOLIC BLOOD PRESSURE: 124 MMHG

## 2020-11-30 DIAGNOSIS — F41.9 ANXIETY: ICD-10-CM

## 2020-11-30 DIAGNOSIS — G89.4 CHRONIC PAIN SYNDROME: ICD-10-CM

## 2020-11-30 DIAGNOSIS — M54.12 RADICULOPATHY, CERVICAL REGION: ICD-10-CM

## 2020-11-30 DIAGNOSIS — M47.812 CERVICAL SPONDYLOSIS: ICD-10-CM

## 2020-11-30 DIAGNOSIS — M50.120 CERVICAL DISC DISORDER WITH RADICULOPATHY OF MID-CERVICAL REGION: ICD-10-CM

## 2020-11-30 DIAGNOSIS — M54.12 CERVICAL RADICULOPATHY: Primary | ICD-10-CM

## 2020-11-30 PROCEDURE — 99204 OFFICE O/P NEW MOD 45 MIN: CPT | Performed by: ANESTHESIOLOGY

## 2020-11-30 RX ORDER — ALPRAZOLAM 0.5 MG/1
1 TABLET ORAL
Qty: 2 TABLET | Refills: 0 | Status: SHIPPED | OUTPATIENT
Start: 2020-11-30 | End: 2021-05-14 | Stop reason: HOSPADM

## 2020-11-30 RX ORDER — DULOXETIN HYDROCHLORIDE 20 MG/1
20 CAPSULE, DELAYED RELEASE ORAL DAILY
Qty: 30 CAPSULE | Refills: 0 | Status: SHIPPED | OUTPATIENT
Start: 2020-11-30 | End: 2021-05-14 | Stop reason: HOSPADM

## 2020-11-30 RX ORDER — ROSUVASTATIN CALCIUM 5 MG/1
5 TABLET, COATED ORAL 2 TIMES WEEKLY
COMMUNITY

## 2020-12-03 ENCOUNTER — TELEPHONE (OUTPATIENT)
Dept: PAIN MEDICINE | Facility: CLINIC | Age: 80
End: 2020-12-03

## 2020-12-21 ENCOUNTER — TELEPHONE (OUTPATIENT)
Dept: PAIN MEDICINE | Facility: CLINIC | Age: 80
End: 2020-12-21

## 2020-12-30 ENCOUNTER — TELEMEDICINE (OUTPATIENT)
Dept: PAIN MEDICINE | Facility: CLINIC | Age: 80
End: 2020-12-30
Payer: MEDICARE

## 2020-12-30 DIAGNOSIS — M54.12 CERVICAL RADICULOPATHY: ICD-10-CM

## 2020-12-30 DIAGNOSIS — M54.2 NECK PAIN: Primary | ICD-10-CM

## 2020-12-30 PROCEDURE — 99441 PR PHYS/QHP TELEPHONE EVALUATION 5-10 MIN: CPT | Performed by: NURSE PRACTITIONER

## 2021-03-02 DIAGNOSIS — Z23 ENCOUNTER FOR IMMUNIZATION: ICD-10-CM

## 2021-05-14 ENCOUNTER — OFFICE VISIT (OUTPATIENT)
Dept: CARDIOLOGY CLINIC | Facility: CLINIC | Age: 81
End: 2021-05-14
Payer: MEDICARE

## 2021-05-14 VITALS
WEIGHT: 125 LBS | HEIGHT: 58 IN | BODY MASS INDEX: 26.24 KG/M2 | DIASTOLIC BLOOD PRESSURE: 80 MMHG | SYSTOLIC BLOOD PRESSURE: 140 MMHG | HEART RATE: 65 BPM

## 2021-05-14 DIAGNOSIS — I47.1 PAROXYSMAL SVT (SUPRAVENTRICULAR TACHYCARDIA) (HCC): Primary | ICD-10-CM

## 2021-05-14 DIAGNOSIS — I10 ESSENTIAL HYPERTENSION: ICD-10-CM

## 2021-05-14 DIAGNOSIS — E78.49 OTHER HYPERLIPIDEMIA: ICD-10-CM

## 2021-05-14 PROCEDURE — 99214 OFFICE O/P EST MOD 30 MIN: CPT | Performed by: INTERNAL MEDICINE

## 2021-05-14 PROCEDURE — 93000 ELECTROCARDIOGRAM COMPLETE: CPT | Performed by: INTERNAL MEDICINE

## 2021-05-14 RX ORDER — METOPROLOL SUCCINATE 100 MG/1
100 TABLET, EXTENDED RELEASE ORAL DAILY
Qty: 90 TABLET | Refills: 5 | Status: SHIPPED | OUTPATIENT
Start: 2021-05-14 | End: 2022-07-05

## 2021-05-14 RX ORDER — EZETIMIBE 10 MG/1
10 TABLET ORAL DAILY
COMMUNITY
Start: 2021-03-12 | End: 2021-05-14 | Stop reason: DRUGHIGH

## 2021-05-14 NOTE — PROGRESS NOTES
Patient ID: Hetal Cabello is a 79 y o  female  Plan:      Essential hypertension  Well controlled  Other hyperlipidemia  Tolerating statin tx  OK to hold zetia for now given age  Paroxysmal SVT (supraventricular tachycardia) (HCC)   Perhaps intermittently present  Not sustained  Will try tweaking up the metoprolol dose  Follow up Plan/Other summary comments:  EKG and f/u      HPI: Patient seen in f/u regarding the above issues  Her main issue recently is her 's memory issues  Her heart rate tends to be fast in the morning and slowly come back to normal   There has been absolutely no angina  We reminisced to bit about [facility]          Results for orders placed or performed in visit on 05/14/21   POCT ECG    Impression    NSR  WNL  Most recent or relevant cardiac/vascular testing:     Echocardiogram 8/5/2019: Normal LV function  Mild to moderate mitral regurgitation  Myoview 8/5/2019: Normal         Past Surgical History:   Procedure Laterality Date    CHOLECYSTECTOMY      TOTAL HIP ARTHROPLASTY       CMP:   Lab Results   Component Value Date    K 4 5 10/18/2019     (H) 10/18/2019    CO2 29 10/18/2019    BUN 11 10/18/2019    CREATININE 0 78 10/18/2019    EGFR 72 10/18/2019       Lipid Profile:   Lab Results   Component Value Date    TRIG 101 10/18/2019    HDL 64 (H) 10/18/2019         Review of Systems   10  point ROS  was otherwise non pertinent or negative except as per HPI or as below  Gait:  Uses a cane  Objective:     /80   Pulse 65   Ht 4' 10" (1 473 m)   Wt 56 7 kg (125 lb)   BMI 26 13 kg/m²     PHYSICAL EXAM:    General:  Normal appearance in no distress  Eyes:  Anicteric  Oral mucosa:  Moist   Neck:  No JVD  Carotid upstrokes are brisk without bruits  No masses  Chest:  Clear to auscultation  Cardiac:  No palpable PMI  Normal S1 and S2  No murmur gallop or rub  Abdomen:  Soft and nontender   No palpable organomegaly or aortic enlargement  Extremities:  No peripheral edema  Musculoskeletal:  Symmetric  Vascular:  Femoral pulses are brisk without bruits  Popliteal pulses are intact bilaterally  Pedal pulses are intact  Neuro:  Grossly symmetric  Psych:  Alert and oriented x3          Current Outpatient Medications:     ALPRAZolam (XANAX) 1 mg tablet, Take by mouth, Disp: , Rfl:     aspirin (Aspirin Low Dose) 81 mg EC tablet, Take 81 mg by mouth daily , Disp: , Rfl:     cholecalciferol (VITAMIN D3) 1,000 units tablet, Take 1,000 Units by mouth daily, Disp: , Rfl:     co-enzyme Q-10 30 MG capsule, Take 30 mg by mouth daily, Disp: , Rfl:     levothyroxine 112 mcg tablet, Take 112 mcg by mouth daily , Disp: , Rfl:     magnesium oxide (MAG-OX) 400 mg, Take 400 mg by mouth once, Disp: , Rfl:     rosuvastatin (CRESTOR) 5 mg tablet, Take 5 mg by mouth 2 (two) times a week , Disp: , Rfl:     traZODone (DESYREL) 50 mg tablet, Take 50 mg by mouth daily at bedtime, Disp: , Rfl:     metoprolol succinate (TOPROL-XL) 100 mg 24 hr tablet, Take 1 tablet (100 mg total) by mouth daily, Disp: 90 tablet, Rfl: 5  Allergies   Allergen Reactions    Penicillins Anaphylaxis    Gabapentin      Pt states she "gets loopy"    Vicodin [Hydrocodone-Acetaminophen] Vomiting     Past Medical History:   Diagnosis Date    Hyperlipidemia     Hypertension     NSTEMI (non-ST elevated myocardial infarction) 08/03/2019    Paroxysmal SVT (supraventricular tachycardia) ([facility]) 8/6/2019           Social History     Tobacco Use   Smoking Status Never Smoker   Smokeless Tobacco Never Used

## 2022-06-01 ENCOUNTER — OFFICE VISIT (OUTPATIENT)
Dept: CARDIOLOGY CLINIC | Facility: CLINIC | Age: 82
End: 2022-06-01
Payer: MEDICARE

## 2022-06-01 VITALS
HEART RATE: 71 BPM | BODY MASS INDEX: 24.77 KG/M2 | DIASTOLIC BLOOD PRESSURE: 78 MMHG | SYSTOLIC BLOOD PRESSURE: 126 MMHG | WEIGHT: 118 LBS | HEIGHT: 58 IN

## 2022-06-01 DIAGNOSIS — I10 ESSENTIAL HYPERTENSION: ICD-10-CM

## 2022-06-01 DIAGNOSIS — I47.1 PAROXYSMAL SVT (SUPRAVENTRICULAR TACHYCARDIA) (HCC): Primary | ICD-10-CM

## 2022-06-01 DIAGNOSIS — E78.49 OTHER HYPERLIPIDEMIA: ICD-10-CM

## 2022-06-01 PROBLEM — R07.2 PRECORDIAL PAIN: Status: ACTIVE | Noted: 2022-06-01

## 2022-06-01 PROCEDURE — 99214 OFFICE O/P EST MOD 30 MIN: CPT | Performed by: INTERNAL MEDICINE

## 2022-06-01 PROCEDURE — 93000 ELECTROCARDIOGRAM COMPLETE: CPT | Performed by: INTERNAL MEDICINE

## 2022-06-01 NOTE — ASSESSMENT & PLAN NOTE
Very brief  We talked about further testing  She would like to hold off unless there is a recurrence  If so, [facility]

## 2022-06-01 NOTE — PROGRESS NOTES
Patient ID: Janine Ovalle is a 79 y o  female  Plan:      Essential hypertension  Well controlled  Paroxysmal SVT (supraventricular tachycardia) (HCC)   Perhaps intermittently present  Not sustained  Other hyperlipidemia  Tolerating statin therapy  Precordial pain  Very brief  We talked about further testing  She would like to hold off unless there is a recurrence  If so, I would [facility]  Follow up Plan/Other summary comments:  Return in about 1 year (around 6/1/2023)  Again if there is recurrence of symptoms then [facility] will be obtained  She knows to call me under those circumstances  HPI: Patient seen in f/u regarding the above issues  She has been under lots of stress as her 's memory has slowly declined  A week ago she had a feeling of epigastric discomfort that lasted for a few minutes  There has been no recurrence  Several days later she had some back discomfort that was associated with a feeling of extra beats  There has been no recurrence  Results for orders placed or performed in visit on 06/01/22   POCT ECG    Impression    NSR  WNL  Most recent or relevant cardiac/vascular testing:     Echocardiogram 8/5/2019: Normal LV function  Mild to moderate mitral regurgitation  Myoview 8/5/2019: Normal         Past Surgical History:   Procedure Laterality Date    CHOLECYSTECTOMY      TOTAL HIP ARTHROPLASTY       CMP:   Lab Results   Component Value Date    K 4 5 10/18/2019     (H) 10/18/2019    CO2 29 10/18/2019    BUN 11 10/18/2019    CREATININE 0 78 10/18/2019    EGFR 72 10/18/2019       Lipid Profile:   Lab Results   Component Value Date    TRIG 101 10/18/2019    HDL 64 (H) 10/18/2019         Review of Systems   10  point ROS  was otherwise non pertinent or negative except as per HPI or as below     Gait: Normal          Objective:     /78   Pulse 71   Ht 4' 10" (1 473 m)   Wt 53 5 kg (118 lb) BMI 24 66 kg/m²     PHYSICAL EXAM:    General:  Normal appearance in no distress  Eyes:  Anicteric  Oral mucosa:  Moist   Neck:  No JVD  Carotid upstrokes are brisk without bruits  No masses  Chest:  Clear to auscultation  Cardiac:  No palpable PMI  Normal S1 and S2  No murmur gallop or rub  Abdomen:  Soft and nontender  No palpable organomegaly or aortic enlargement  Extremities:  No peripheral edema  Musculoskeletal:  Symmetric  Vascular:  Femoral pulses are brisk without bruits  Popliteal pulses are intact bilaterally  Pedal pulses are intact  Neuro:  Grossly symmetric  Psych:  Alert and oriented x3          Current Outpatient Medications:     ALPRAZolam (XANAX) 1 mg tablet, Take by mouth, Disp: , Rfl:     aspirin (ECOTRIN) 325 mg EC tablet, Take 325 mg by mouth daily, Disp: , Rfl:     cholecalciferol (VITAMIN D3) 1,000 units tablet, Take 1,000 Units by mouth daily, Disp: , Rfl:     co-enzyme Q-10 30 MG capsule, Take 30 mg by mouth daily, Disp: , Rfl:     levothyroxine 112 mcg tablet, Take 112 mcg by mouth daily , Disp: , Rfl:     metoprolol succinate (TOPROL-XL) 100 mg 24 hr tablet, Take 1 tablet (100 mg total) by mouth daily, Disp: 90 tablet, Rfl: 5    rosuvastatin (CRESTOR) 5 mg tablet, Take 5 mg by mouth 2 (two) times a week , Disp: , Rfl:     magnesium oxide (MAG-OX) 400 mg, Take 400 mg by mouth once (Patient not taking: Reported on 6/1/2022), Disp: , Rfl:     traZODone (DESYREL) 50 mg tablet, Take 50 mg by mouth daily at bedtime (Patient not taking: Reported on 6/1/2022), Disp: , Rfl:   Allergies   Allergen Reactions    Penicillins Anaphylaxis    Gabapentin      Pt states she "gets loopy"    Hydrodiuril [Hydrochlorothiazide] Hives    Tramadol Other (See Comments)     shaking    Vicodin [Hydrocodone-Acetaminophen] Vomiting    Pedi-Pre Tape Spray [Wound Dressing Adhesive] Rash     Past Medical History:   Diagnosis Date    Hyperlipidemia     Hypertension     NSTEMI (non-ST elevated myocardial infarction) 08/03/2019    Paroxysmal SVT [facility]supra[facility]ricular tachycardia) ([facility] [address] ) 8/6/2019           Social History     Tobacco Use   Smoking Status Never Smoker   Smokeless Tobacco Never Used

## 2022-07-04 DIAGNOSIS — I10 ESSENTIAL HYPERTENSION: ICD-10-CM

## 2022-07-04 DIAGNOSIS — I47.1 PAROXYSMAL SVT (SUPRAVENTRICULAR TACHYCARDIA) (HCC): ICD-10-CM

## 2022-07-05 RX ORDER — METOPROLOL SUCCINATE 100 MG/1
TABLET, EXTENDED RELEASE ORAL
Qty: 90 TABLET | Refills: 3 | Status: SHIPPED | OUTPATIENT
Start: 2022-07-05

## 2023-05-15 DIAGNOSIS — I10 ESSENTIAL HYPERTENSION: ICD-10-CM

## 2023-05-15 DIAGNOSIS — I47.1 PAROXYSMAL SVT (SUPRAVENTRICULAR TACHYCARDIA) (HCC): ICD-10-CM

## 2023-05-15 RX ORDER — METOPROLOL SUCCINATE 100 MG/1
TABLET, EXTENDED RELEASE ORAL
Qty: 90 TABLET | Refills: 3 | Status: SHIPPED | OUTPATIENT
Start: 2023-05-15

## 2023-08-03 ENCOUNTER — OFFICE VISIT (OUTPATIENT)
Dept: CARDIOLOGY CLINIC | Facility: CLINIC | Age: 83
End: 2023-08-03
Payer: MEDICARE

## 2023-08-03 VITALS
BODY MASS INDEX: 26.66 KG/M2 | DIASTOLIC BLOOD PRESSURE: 80 MMHG | SYSTOLIC BLOOD PRESSURE: 130 MMHG | WEIGHT: 127 LBS | HEART RATE: 60 BPM | HEIGHT: 58 IN

## 2023-08-03 DIAGNOSIS — I47.1 PAROXYSMAL SVT (SUPRAVENTRICULAR TACHYCARDIA) (HCC): Primary | ICD-10-CM

## 2023-08-03 DIAGNOSIS — R07.2 PRECORDIAL PAIN: ICD-10-CM

## 2023-08-03 DIAGNOSIS — I10 ESSENTIAL HYPERTENSION: ICD-10-CM

## 2023-08-03 PROCEDURE — 99214 OFFICE O/P EST MOD 30 MIN: CPT | Performed by: INTERNAL MEDICINE

## 2023-08-03 PROCEDURE — 93000 ELECTROCARDIOGRAM COMPLETE: CPT | Performed by: INTERNAL MEDICINE

## 2023-08-03 NOTE — PROGRESS NOTES
Patient ID: Demi Boland is a 79 y.o. female. Plan:      Essential hypertension  Well controlled and would continue current regimen. Paroxysmal SVT (supraventricular tachycardia) (HCC)  No sustained symptoms of tachycardia. Precordial pain  Occasional right jaw discomfort but seems positionally precipitated and less likely coronary at this point. Follow up Plan/Other summary comments:  As needed follow-up was reviewed with the patient's daughter. Only change I made today was stopping aspirin. There is no overt vascular disease. HPI:   Patient is seen in follow-up today regarding the above. She has not had any new cardiac issues since the last visit. She remains curious about the world and a big reader by her description. Results for orders placed or performed in visit on 08/03/23   POCT ECG    Impression    NSR. First Degree AV Block. Low voltage. Otherwise WNL. Most recent or relevant cardiac/vascular testing:    Echocardiogram 8/5/2019: Normal LV function. Mild to moderate mitral regurgitation. Myoview 8/5/2019: Normal.        Past Surgical History:   Procedure Laterality Date   • CHOLECYSTECTOMY     • TOTAL HIP ARTHROPLASTY         Lipid Profile:   Lab Results   Component Value Date    TRIG 101 10/18/2019    HDL 64 (H) 10/18/2019         Review of Systems   10  point ROS  was otherwise non pertinent or negative except as per HPI or as below. Gait:  Normal.        Objective:     /80 (BP Location: Right arm, Patient Position: Sitting)   Pulse 60   Ht 4' 10" (1.473 m)   Wt 57.6 kg (127 lb)   BMI 26.54 kg/m²     PHYSICAL EXAM:    General:  Normal appearance in no distress. Eyes:  Anicteric. Oral mucosa:  Moist.  Neck:  No JVD. Carotid upstrokes are brisk without bruits. No masses. Chest:  Clear to auscultation. Cardiac:  No palpable PMI. Normal S1 and S2. No murmur gallop or rub. Abdomen:  Soft and nontender.  No palpable organomegaly or aortic enlargement. Extremities:  No peripheral edema. Musculoskeletal:  Symmetric. Vascular:  Femoral pulses are brisk without bruits. Popliteal pulses are intact bilaterally. Pedal pulses are intact. Neuro:  Grossly symmetric. Psych:  Alert and oriented x3.         Current Outpatient Medications:   •  ALPRAZolam (XANAX) 1 mg tablet, Take by mouth, Disp: , Rfl:   •  aspirin (ECOTRIN) 325 mg EC tablet, Take 325 mg by mouth daily, Disp: , Rfl:   •  cholecalciferol (VITAMIN D3) 1,000 units tablet, Take 1,000 Units by mouth daily, Disp: , Rfl:   •  co-enzyme Q-10 30 MG capsule, Take 30 mg by mouth daily, Disp: , Rfl:   •  levothyroxine 112 mcg tablet, Take 112 mcg by mouth daily , Disp: , Rfl:   •  metoprolol succinate (TOPROL-XL) 100 mg 24 hr tablet, TAKE 1 TABLET DAILY, Disp: 90 tablet, Rfl: 3  •  rosuvastatin (CRESTOR) 5 mg tablet, Take 5 mg by mouth 2 (two) times a week , Disp: , Rfl:   •  magnesium oxide (MAG-OX) 400 mg, Take 400 mg by mouth once (Patient not taking: Reported on 6/1/2022), Disp: , Rfl:   •  traZODone (DESYREL) 50 mg tablet, Take 50 mg by mouth daily at bedtime (Patient not taking: Reported on 6/1/2022), Disp: , Rfl:   Allergies   Allergen Reactions   • Penicillins Anaphylaxis   • Gabapentin      Pt states she "gets loopy"   • Hydrodiuril [Hydrochlorothiazide] Hives   • Tramadol Other (See Comments)     shaking   • Vicodin [Hydrocodone-Acetaminophen] Vomiting   • Pedi-Pre Tape Spray [Wound Dressing Adhesive] Rash     Past Medical History:   Diagnosis Date   • Hyperlipidemia    • Hypertension    • NSTEMI (non-ST elevated myocardial infarction) 08/03/2019   • Paroxysmal SVT (supraventricular tachycardia) ([address]) 8/6/2019           Social History     Tobacco Use   Smoking Status Never   Smokeless Tobacco Never

## 2023-08-03 NOTE — ASSESSMENT & PLAN NOTE
Occasional right jaw discomfort but seems positionally precipitated and less likely coronary at this point.

## 2023-09-07 ENCOUNTER — TELEPHONE (OUTPATIENT)
Dept: FAMILY MEDICINE CLINIC | Facility: CLINIC | Age: 83
End: 2023-09-07

## 2023-09-07 DIAGNOSIS — F41.9 ANXIETY: Primary | ICD-10-CM

## 2023-09-07 RX ORDER — ALPRAZOLAM 1 MG/1
1 TABLET ORAL
Qty: 90 TABLET | Refills: 0 | Status: SHIPPED | OUTPATIENT
Start: 2023-09-07

## 2023-09-26 ENCOUNTER — CLINICAL SUPPORT (OUTPATIENT)
Dept: FAMILY MEDICINE CLINIC | Facility: CLINIC | Age: 83
End: 2023-09-26
Payer: MEDICARE

## 2023-09-26 DIAGNOSIS — M81.0 OSTEOPOROSIS, UNSPECIFIED OSTEOPOROSIS TYPE, UNSPECIFIED PATHOLOGICAL FRACTURE PRESENCE: Primary | ICD-10-CM

## 2023-09-26 PROCEDURE — 96372 THER/PROPH/DIAG INJ SC/IM: CPT

## 2023-12-15 ENCOUNTER — TELEPHONE (OUTPATIENT)
Dept: FAMILY MEDICINE CLINIC | Facility: CLINIC | Age: 83
End: 2023-12-15

## 2023-12-18 ENCOUNTER — DOCUMENTATION (OUTPATIENT)
Dept: FAMILY MEDICINE CLINIC | Facility: CLINIC | Age: 83
End: 2023-12-18

## 2023-12-18 DIAGNOSIS — F41.9 ANXIETY: ICD-10-CM

## 2023-12-26 DIAGNOSIS — F41.9 ANXIETY: ICD-10-CM

## 2023-12-26 RX ORDER — ALPRAZOLAM 1 MG/1
1 TABLET ORAL
Qty: 7 TABLET | Refills: 0 | Status: SHIPPED | OUTPATIENT
Start: 2023-12-26 | End: 2024-01-03 | Stop reason: SDUPTHER

## 2023-12-26 RX ORDER — ALPRAZOLAM 1 MG/1
1 TABLET ORAL
Qty: 90 TABLET | Refills: 0 | Status: CANCELLED | OUTPATIENT
Start: 2023-12-26

## 2023-12-26 NOTE — TELEPHONE ENCOUNTER
Verified pdmp last filled 09/07/23. Patient asking for a couple day supply to hold her over until mailorder comes in only has a few days left. Patient would like the small supply to [facility].

## 2024-01-03 ENCOUNTER — TELEPHONE (OUTPATIENT)
Dept: FAMILY MEDICINE CLINIC | Facility: CLINIC | Age: 84
End: 2024-01-03

## 2024-01-03 DIAGNOSIS — F41.9 ANXIETY: ICD-10-CM

## 2024-01-03 RX ORDER — ALPRAZOLAM 1 MG/1
1 TABLET ORAL
Qty: 90 TABLET | Refills: 0 | Status: SHIPPED | OUTPATIENT
Start: 2024-01-03

## 2024-01-03 NOTE — TELEPHONE ENCOUNTER
Yes, my name is Carly Steen. I'm calling about my mother Ludy or I ordered a medication for her that I'll Alprazolam about a week or so ago and I'd like to know the status of that. I was just told that there was a small prescription at the pharmacy in [place]. I wanted to know if there was a 90 day supply sent to [facility] recently. Thank you.  You received a voice mail from CARLY STEEN.

## 2024-01-04 RX ORDER — ALPRAZOLAM 1 MG/1
TABLET ORAL
Qty: 90 TABLET | Refills: 0 | Status: SHIPPED | OUTPATIENT
Start: 2024-01-04

## 2024-01-08 DIAGNOSIS — E78.49 OTHER HYPERLIPIDEMIA: Primary | ICD-10-CM

## 2024-01-08 RX ORDER — ROSUVASTATIN CALCIUM 5 MG/1
TABLET, COATED ORAL
Qty: 36 TABLET | Refills: 3 | Status: SHIPPED | OUTPATIENT
Start: 2024-01-08

## 2024-02-26 ENCOUNTER — RA CDI HCC (OUTPATIENT)
Dept: OTHER | Facility: HOSPITAL | Age: 84
End: 2024-02-26

## 2024-03-04 ENCOUNTER — OFFICE VISIT (OUTPATIENT)
Dept: FAMILY MEDICINE CLINIC | Facility: CLINIC | Age: 84
End: 2024-03-04
Payer: MEDICARE

## 2024-03-04 VITALS
DIASTOLIC BLOOD PRESSURE: 84 MMHG | HEART RATE: 52 BPM | OXYGEN SATURATION: 97 % | SYSTOLIC BLOOD PRESSURE: 126 MMHG | TEMPERATURE: 97 F | WEIGHT: 129.2 LBS | HEIGHT: 57 IN | BODY MASS INDEX: 27.87 KG/M2

## 2024-03-04 DIAGNOSIS — I47.10 PAROXYSMAL SVT (SUPRAVENTRICULAR TACHYCARDIA): Chronic | ICD-10-CM

## 2024-03-04 DIAGNOSIS — R73.9 ELEVATED BLOOD SUGAR: ICD-10-CM

## 2024-03-04 DIAGNOSIS — I10 ESSENTIAL HYPERTENSION: ICD-10-CM

## 2024-03-04 DIAGNOSIS — K22.70 BARRETT'S ESOPHAGUS WITHOUT DYSPLASIA: ICD-10-CM

## 2024-03-04 DIAGNOSIS — Z00.00 MEDICARE ANNUAL WELLNESS VISIT, SUBSEQUENT: Primary | ICD-10-CM

## 2024-03-04 DIAGNOSIS — E03.9 ACQUIRED HYPOTHYROIDISM: ICD-10-CM

## 2024-03-04 DIAGNOSIS — E78.2 MIXED HYPERLIPIDEMIA: ICD-10-CM

## 2024-03-04 DIAGNOSIS — E03.8 OTHER SPECIFIED HYPOTHYROIDISM: ICD-10-CM

## 2024-03-04 DIAGNOSIS — I47.10 SVT (SUPRAVENTRICULAR TACHYCARDIA): ICD-10-CM

## 2024-03-04 PROCEDURE — G0439 PPPS, SUBSEQ VISIT: HCPCS | Performed by: INTERNAL MEDICINE

## 2024-03-04 PROCEDURE — 99213 OFFICE O/P EST LOW 20 MIN: CPT | Performed by: INTERNAL MEDICINE

## 2024-03-04 RX ORDER — OMEPRAZOLE 40 MG/1
40 CAPSULE, DELAYED RELEASE ORAL 2 TIMES DAILY
Qty: 180 CAPSULE | Refills: 3 | Status: SHIPPED | OUTPATIENT
Start: 2024-03-04 | End: 2024-08-31

## 2024-03-04 NOTE — PATIENT INSTRUCTIONS
Medicare Preventive Visit Patient Instructions  Thank you for completing your Welcome to Medicare Visit or Medicare Annual Wellness Visit today. Your next wellness visit will be due in one year (3/5/2025).  The screening/preventive services that you may require over the next 5-10 years are detailed below. Some tests may not apply to you based off risk factors and/or age. Screening tests ordered at today's visit but not completed yet may show as past due. Also, please note that scanned in results may not display below.  Preventive Screenings:  Service Recommendations Previous Testing/Comments   Colorectal Cancer Screening  * Colonoscopy    * Fecal Occult Blood Test (FOBT)/Fecal Immunochemical Test (FIT)  * Fecal DNA/Cologuard Test  * Flexible Sigmoidoscopy Age: 45-75 years old   Colonoscopy: every 10 years (may be performed more frequently if at higher risk)  OR  FOBT/FIT: every 1 year  OR  Cologuard: every 3 years  OR  Sigmoidoscopy: every 5 years  Screening may be recommended earlier than age 45 if at higher risk for colorectal cancer. Also, an individualized decision between you and your healthcare provider will decide whether screening between the ages of 76-85 would be appropriate. Colonoscopy: Not on file  FOBT/FIT: Not on file  Cologuard: Not on file  Sigmoidoscopy: Not on file          Breast Cancer Screening Age: 40+ years old  Frequency: every 1-2 years  Not required if history of left and right mastectomy Mammogram: Not on file        Cervical Cancer Screening Between the ages of 21-29, pap smear recommended once every 3 years.   Between the ages of 30-65, can perform pap smear with HPV co-testing every 5 years.   Recommendations may differ for women with a history of total hysterectomy, cervical cancer, or abnormal pap smears in past. Pap Smear: Not on file        Hepatitis C Screening Once for adults born between 1945 and 1965  More frequently in patients at high risk for Hepatitis C Hep C Antibody: Not  on file        Diabetes Screening 1-2 times per year if you're at risk for diabetes or have pre-diabetes Fasting glucose: 83 mg/dL (10/18/2019)  A1C: 5.5 % (8/4/2019)      Cholesterol Screening Once every 5 years if you don't have a lipid disorder. May order more often based on risk factors. Lipid panel: 10/18/2019          Other Preventive Screenings Covered by Medicare:  Abdominal Aortic Aneurysm (AAA) Screening: covered once if your at risk. You're considered to be at risk if you have a family history of AAA.  Lung Cancer Screening: covers low dose CT scan once per year if you meet all of the following conditions: (1) Age 55-77; (2) No signs or symptoms of lung cancer; (3) Current smoker or have quit smoking within the last 15 years; (4) You have a tobacco smoking history of at least 20 pack years (packs per day multiplied by number of years you smoked); (5) You get a written order from a healthcare provider.  Glaucoma Screening: covered annually if you're considered high risk: (1) You have diabetes OR (2) Family history of glaucoma OR (3)  aged 50 and older OR (4)  American aged 65 and older  Osteoporosis Screening: covered every 2 years if you meet one of the following conditions: (1) You're estrogen deficient and at risk for osteoporosis based off medical history and other findings; (2) Have a vertebral abnormality; (3) On glucocorticoid therapy for more than 3 months; (4) Have primary hyperparathyroidism; (5) On osteoporosis medications and need to assess response to drug therapy.   Last bone density test (DXA Scan): 07/23/2018.  HIV Screening: covered annually if you're between the age of 15-65. Also covered annually if you are younger than 15 and older than 65 with risk factors for HIV infection. For pregnant patients, it is covered up to 3 times per pregnancy.    Immunizations:  Immunization Recommendations   Influenza Vaccine Annual influenza vaccination during flu season is  recommended for all persons aged >= 6 months who do not have contraindications   Pneumococcal Vaccine   * Pneumococcal conjugate vaccine = PCV13 (Prevnar 13), PCV15 (Vaxneuvance), PCV20 (Prevnar 20)  * Pneumococcal polysaccharide vaccine = PPSV23 (Pneumovax) Adults 19-65 yo with certain risk factors or if 65+ yo  If never received any pneumonia vaccine: recommend Prevnar 20 (PCV20)  Give PCV20 if previously received 1 dose of PCV13 or PPSV23   Hepatitis B Vaccine 3 dose series if at intermediate or high risk (ex: diabetes, end stage renal disease, liver disease)   Respiratory syncytial virus (RSV) Vaccine - COVERED BY MEDICARE PART D  * RSVPreF3 (Arexvy) CDC recommends that adults 60 years of age and older may receive a single dose of RSV vaccine using shared clinical decision-making (SCDM)   Tetanus (Td) Vaccine - COST NOT COVERED BY MEDICARE PART B Following completion of primary series, a booster dose should be given every 10 years to maintain immunity against tetanus. Td may also be given as tetanus wound prophylaxis.   Tdap Vaccine - COST NOT COVERED BY MEDICARE PART B Recommended at least once for all adults. For pregnant patients, recommended with each pregnancy.   Shingles Vaccine (Shingrix) - COST NOT COVERED BY MEDICARE PART B  2 shot series recommended in those 19 years and older who have or will have weakened immune systems or those 50 years and older     Health Maintenance Due:  There are no preventive care reminders to display for this patient.  Immunizations Due:      Topic Date Due   • Pneumococcal Vaccine: 65+ Years (1 of 2 - PCV) Never done   • COVID-19 Vaccine (1 - 2023-24 season) Never done     Advance Directives   What are advance directives?  Advance directives are legal documents that state your wishes and plans for medical care. These plans are made ahead of time in case you lose your ability to make decisions for yourself. Advance directives can apply to any medical decision, such as the  treatments you want, and if you want to donate organs.   What are the types of advance directives?  There are many types of advance directives, and each state has rules about how to use them. You may choose a combination of any of the following:  Living will:  This is a written record of the treatment you want. You can also choose which treatments you do not want, which to limit, and which to stop at a certain time. This includes surgery, medicine, IV fluid, and tube feedings.   Durable power of  for healthcare (DPAHC):  This is a written record that states who you want to make healthcare choices for you when you are unable to make them for yourself. This person, called a proxy, is usually a family member or a friend. You may choose more than 1 proxy.  Do not resuscitate (DNR) order:  A DNR order is used in case your heart stops beating or you stop breathing. It is a request not to have certain forms of treatment, such as CPR. A DNR order may be included in other types of advance directives.  Medical directive:  This covers the care that you want if you are in a coma, near death, or unable to make decisions for yourself. You can list the treatments you want for each condition. Treatment may include pain medicine, surgery, blood transfusions, dialysis, IV or tube feedings, and a ventilator (breathing machine).  Values history:  This document has questions about your views, beliefs, and how you feel and think about life. This information can help others choose the care that you would choose.  Why are advance directives important?  An advance directive helps you control your care. Although spoken wishes may be used, it is better to have your wishes written down. Spoken wishes can be misunderstood, or not followed. Treatments may be given even if you do not want them. An advance directive may make it easier for your family to make difficult choices about your care.   Weight Management   Why it is important to  manage your weight:  Being overweight increases your risk of health conditions such as heart disease, high blood pressure, type 2 diabetes, and certain types of cancer. It can also increase your risk for osteoarthritis, sleep apnea, and other respiratory problems. Aim for a slow, steady weight loss. Even a small amount of weight loss can lower your risk of health problems.  How to lose weight safely:  A safe and healthy way to lose weight is to eat fewer calories and get regular exercise. You can lose up about 1 pound a week by decreasing the number of calories you eat by 500 calories each day.   Healthy meal plan for weight management:  A healthy meal plan includes a variety of foods, contains fewer calories, and helps you stay healthy. A healthy meal plan includes the following:  Eat whole-grain foods more often.  A healthy meal plan should contain fiber. Fiber is the part of grains, fruits, and vegetables that is not broken down by your body. Whole-grain foods are healthy and provide extra fiber in your diet. Some examples of whole-grain foods are whole-wheat breads and pastas, oatmeal, brown rice, and bulgur.  Eat a variety of vegetables every day.  Include dark, leafy greens such as spinach, kale, sheryl greens, and mustard greens. Eat yellow and orange vegetables such as carrots, sweet potatoes, and winter squash.   Eat a variety of fruits every day.  Choose fresh or canned fruit (canned in its own juice or light syrup) instead of juice. Fruit juice has very little or no fiber.  Eat low-fat dairy foods.  Drink fat-free (skim) milk or 1% milk. Eat fat-free yogurt and low-fat cottage cheese. Try low-fat cheeses such as mozzarella and other reduced-fat cheeses.  Choose meat and other protein foods that are low in fat.  Choose beans or other legumes such as split peas or lentils. Choose fish, skinless poultry (chicken or turkey), or lean cuts of red meat (beef or pork). Before you cook meat or poultry, cut off  any visible fat.   Use less fat and oil.  Try baking foods instead of frying them. Add less fat, such as margarine, sour cream, regular salad dressing and mayonnaise to foods. Eat fewer high-fat foods. Some examples of high-fat foods include french fries, doughnuts, ice cream, and cakes.  Eat fewer sweets.  Limit foods and drinks that are high in sugar. This includes candy, cookies, regular soda, and sweetened drinks.  Exercise:  Exercise at least 30 minutes per day on most days of the week. Some examples of exercise include walking, biking, dancing, and swimming. You can also fit in more physical activity by taking the stairs instead of the elevator or parking farther away from stores. Ask your healthcare provider about the best exercise plan for you.      © Copyright [employer] 2018 Information is for End User's use only and may not be sold, redistributed or otherwise used for commercial purposes. All illustrations and images included in CareNotes® are the copyrighted property of [employer] or [employer]

## 2024-03-04 NOTE — ASSESSMENT & PLAN NOTE
Labs has not been tested in 3 years, recheck these labs now.  She has been faithful on her thyroid meds.

## 2024-03-04 NOTE — PROGRESS NOTES
Assessment and Plan:     Problem List Items Addressed This Visit       Essential hypertension     Continue current medical therapy.  Blood pressure appears well-controlled.         Relevant Orders    CBC and differential    Comprehensive metabolic panel    Other specified hypothyroidism     Labs has not been tested in 3 years, recheck these labs now.  She has been faithful on her thyroid meds.         SVT (supraventricular tachycardia) (Chronic)     Remains reasonably controlled on metoprolol.  Occasionally has palpitations in the morning before she takes her metoprolol but easily controlled.         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  Will increase to omeprazole 40 mg twice daily for 6 weeks, then try to taper days of 40 mg daily.         Relevant Medications    omeprazole (PriLOSEC) 40 MG capsule    Medicare annual wellness visit, subsequent - Primary     Reviewed the AWV questionnaire.  Went through standard need for appropriate screening tests based on risk factors..  Reviewed cognitive issues.  Reviewed living will and DURABLE POWER OF .  Discussed healthy lifestyle, healthy diet.  Reviewed vaccines.  Encourage exercise.  Discussed safety issues within the home and about.           Other Visit Diagnoses       Paroxysmal SVT (supraventricular tachycardia)  (Chronic)       Elevated blood sugar        Relevant Orders    Hemoglobin A1C    Mixed hyperlipidemia        Relevant Orders    Lipid Panel with Direct LDL reflex    Acquired hypothyroidism        Relevant Orders    TSH, 3rd generation    T4, free            Depression Screening and Follow-up Plan: Patient was screened for depression during today's encounter. They screened negative with a PHQ-2 score of 0.    Falls Plan of Care: balance, strength, and gait training instructions were provided. Recommended assistive device to help with gait and balance. Medications that increase  falls were reviewed. Home safety education provided.       Preventive health issues were discussed with patient, and age appropriate screening tests were ordered as noted in patient's After Visit Summary.  Personalized health advice and appropriate referrals for health education or preventive services given if needed, as noted in patient's After Visit Summary.     History of Present Illness:     Patient presents for a Medicare Wellness Visit    Patient presents for their annual medical wellness visit.  Reviewed medical intake questionnaire.  Discussed living will and DURABLE POWER OF .  Discussed importance of these 2 documents.  Reviewed the various screening modalities the patient was due for.  Reviewed home safety as well as depression and risk of falling.  Through shared medical decision making move forward with testing or blood work as ordered.  The patient presents with issues with abnormal bone growth within her jaw.  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  This is led to some dental issues, and some mild discomfort at times.  She is currently due for Prolia but this is her fifth year of Prolia injections.  Not had a bone density in 5 years, but also has not had an appointment in over 3 years.  This is our first meeting together and she has not had blood work in some time.  She is struggling at home, taking care of her demented .  She is here with her daughter Carly, who helps out around the house significantly.  She is also her transport.       Patient Care Team:  Tanmay Encarnacion DO as PCP - General (Internal Medicine)     Review of Systems:     Review of Systems   Constitutional:  Negative for chills and fever.   HENT:  Positive for hearing loss. Negative for rhinorrhea and sore throat.    Eyes:  Negative for visual disturbance.   Respiratory:  Negative for cough and shortness of breath.    Cardiovascular:  Negative for chest pain and leg  swelling.   Gastrointestinal:  Negative for abdominal pain, diarrhea, nausea and vomiting.   Genitourinary:  Negative for dysuria.   Musculoskeletal:  Positive for arthralgias, back pain, gait problem and myalgias.   Skin:  Negative for rash.   Neurological:  Negative for dizziness and headaches.   Psychiatric/Behavioral:  Positive for confusion.    All other systems reviewed and are negative.       Problem List:     Patient Active Problem List   Diagnosis    Essential hypertension    Other hyperlipidemia    Palpitations    Other specified hypothyroidism    NSTEMI, initial episode of care ([facility])    Neck pain    Cervical radiculopathy    Precordial pain    SVT (supraventricular tachycardia)    Calderón's esophagus without dysplasia    Medicare annual wellness visit, subsequent      Past Medical and Surgical History:     Past Medical History:   Diagnosis Date    Allergic     Anxiety     Arthritis     Disease of thyroid gland 1977    Diverticulitis of colon     GERD (gastroesophageal reflux disease)     Heart murmur     HL (hearing loss)     Hyperlipidemia     Hypertension     NSTEMI (non-ST elevated myocardial infarction) 08/03/2019    Osteoporosis     Paroxysmal SVT (supraventricular tachycardia) 08/06/2019    Scoliosis     Visual impairment      Past Surgical History:   Procedure Laterality Date    CHOLECYSTECTOMY      EYE SURGERY      JOINT REPLACEMENT      TOTAL HIP ARTHROPLASTY        Family History:     Family History   Problem Relation Age of Onset    Coronary artery disease Son         severe    Asthma Son     Heart disease Sister     Thyroid disease Sister     Arthritis Sister     Hearing loss Sister     Glaucoma Daughter       Social History:     Social History     Socioeconomic History    Marital status: /Civil Union     Spouse name: None    Number of children: None    Years of education: None    Highest education level: None   Occupational History    None   Tobacco Use    Smoking status: Never      "Passive exposure: Never    Smokeless tobacco: Never   Vaping Use    Vaping status: Never Used   Substance and Sexual Activity    Alcohol use: Never     Comment: n/a    Drug use: Never    Sexual activity: Not Currently     Comment: Caretaker for my  Jaziel, Dementia   Other Topics Concern    None   Social History Narrative    None     Social Determinants of Health     Financial Resource Strain: Low Risk  (3/4/2024)    Overall Financial Resource Strain (CARDIA)     Difficulty of Paying Living Expenses: Not hard at all   Food Insecurity: Not on file   Transportation Needs: No Transportation Needs (3/4/2024)    PRAPARE - Transportation     Lack of Transportation (Medical): No     Lack of Transportation (Non-Medical): No   Physical Activity: Not on file   Stress: Not on file   Social Connections: Not on file   Intimate Partner Violence: Not on file   Housing Stability: Not on file      Medications and Allergies:     Current Outpatient Medications   Medication Sig Dispense Refill    ALPRAZolam (XANAX) 1 mg tablet TAKE 1 TABLET DAILY AT BEDTIME AS NEEDED FOR ANXIETY 90 tablet 0    ALPRAZolam (XANAX) 1 mg tablet Take 1 tablet (1 mg total) by mouth daily at bedtime as needed for anxiety for up to 90 doses 90 tablet 0    co-enzyme Q-10 30 MG capsule Take 30 mg by mouth daily      levothyroxine 112 mcg tablet Take 112 mcg by mouth daily       magnesium oxide (MAG-OX) 400 mg Take 400 mg by mouth once      metoprolol succinate (TOPROL-XL) 100 mg 24 hr tablet TAKE 1 TABLET DAILY 90 tablet 3    omeprazole (PriLOSEC) 40 MG capsule Take 1 capsule (40 mg total) by mouth 2 (two) times a day 180 capsule 3    rosuvastatin (CRESTOR) 5 mg tablet TAKE 1 TABLET EVERY MONDAY AND THURSDAY 36 tablet 3     No current facility-administered medications for this visit.     Allergies   Allergen Reactions    Penicillins Anaphylaxis    Gabapentin      Pt states she \"gets loopy\"    Hydrodiuril [Hydrochlorothiazide] Hives    Tramadol Other (See " Comments)     shaking    Vicodin [Hydrocodone-Acetaminophen] Vomiting    Pedi-Pre Tape Spray [Wound Dressing Adhesive] Rash      Immunizations:       There is no immunization history on file for this patient.   Health Maintenance:     There are no preventive care reminders to display for this patient.      Topic Date Due    Pneumococcal Vaccine: 65+ Years (1 of 2 - PCV) Never done    COVID-19 Vaccine (1 - 2023-24 season) Never done      Medicare Screening Tests and Risk Assessments:     Ludy is here for her Subsequent Wellness visit. Last Medicare Wellness visit information reviewed, patient interviewed, no change since last AWV.     Health Risk Assessment:   Patient rates overall health as very good. Patient feels that their physical health rating is same. Patient is satisfied with their life. Eyesight was rated as same. Hearing was rated as slightly worse. Patient feels that their emotional and mental health rating is same. Patients states they are never, rarely angry. Patient states they are sometimes unusually tired/fatigued. Pain experienced in the last 7 days has been none. Patient states that she has experienced no weight loss or gain in last 6 months.     Depression Screening:   PHQ-2 Score: 0      Fall Risk Screening:   In the past year, patient has experienced: history of falling in past year    Number of falls: 2 or more  Injured during fall?: No    Feels unsteady when standing or walking?: No    Worried about falling?: No      Urinary Incontinence Screening:   Patient has not leaked urine accidently in the last six months. No injury    Home Safety:  Patient has trouble with stairs inside or outside of their home. Patient has working smoke alarms and has no working carbon monoxide detector. Home safety hazards include: none.     Nutrition:   Current diet is Regular.     Medications:   Patient is currently taking over-the-counter supplements. OTC medications include: see medication list. Patient is able  to manage medications.     Activities of Daily Living (ADLs)/Instrumental Activities of Daily Living (IADLs):   Walk and transfer into and out of bed and chair?: Yes  Dress and groom yourself?: Yes    Bathe or shower yourself?: Yes    Feed yourself? Yes  Do your laundry/housekeeping?: Yes  Manage your money, pay your bills and track your expenses?: Yes  Make your own meals?: Yes    Do your own shopping?: No    Previous Hospitalizations:   Any hospitalizations or ED visits within the last 12 months?: No      Advance Care Planning:   Living will: Yes    Durable POA for healthcare: Yes    Advanced directive: Yes    Advanced directive counseling given: Yes    ACP document given: Yes    Patient declined ACP directive: No    End of Life Decisions reviewed with patient: Yes    Provider agrees with end of life decisions: Yes      Cognitive Screening:   Provider or family/friend/caregiver concerned regarding cognition?: No    PREVENTIVE SCREENINGS      Cardiovascular Screening:    General: Screening Not Indicated and History Lipid Disorder      Diabetes Screening:     General: Risks and Benefits Discussed      Colorectal Cancer Screening:     General: Screening Not Indicated      Breast Cancer Screening:     General: Screening Not Indicated      Cervical Cancer Screening:    General: Screening Not Indicated      Osteoporosis Screening:    General: Screening Not Indicated and History Osteoporosis      Abdominal Aortic Aneurysm (AAA) Screening:        General: Screening Not Indicated      Lung Cancer Screening:     General: Screening Not Indicated      Hepatitis C Screening:    General: Screening Not Indicated    Hep C Screening Accepted: No     Screening, Brief Intervention, and Referral to Treatment (SBIRT)    Screening      Single Item Drug Screening:  How often have you used an illegal drug (including marijuana) or a prescription medication for non-medical reasons in the past year? never    Single Item Drug Screen Score:  "0  Interpretation: Negative screen for possible drug use disorder    Other Counseling Topics:   Car/seat belt/driving safety and calcium and vitamin D intake.     No results found.     Physical Exam:     /84 (BP Location: Left arm, Patient Position: Sitting, Cuff Size: Adult)   Pulse (!) 52   Temp (!) 97 °F (36.1 °C) (Tympanic)   Ht 4' 9.25\" (1.454 m)   Wt 58.6 kg (129 lb 3.2 oz)   SpO2 97%   BMI 27.71 kg/m²     Physical Exam  Vitals and nursing note reviewed.   Constitutional:       General: She is not in acute distress.     Appearance: Normal appearance. She is well-developed.   HENT:      Head: Normocephalic and atraumatic.   Eyes:      Conjunctiva/sclera: Conjunctivae normal.   Cardiovascular:      Rate and Rhythm: Normal rate and regular rhythm.      Heart sounds: Murmur heard.   Pulmonary:      Effort: Pulmonary effort is normal. No respiratory distress.      Breath sounds: Normal breath sounds.   Abdominal:      Palpations: Abdomen is soft.      Tenderness: There is no abdominal tenderness.   Musculoskeletal:         General: No swelling.      Cervical back: Normal range of motion and neck supple.      Comments: Generalized DJD   Skin:     General: Skin is warm and dry.      Capillary Refill: Capillary refill takes less than 2 seconds.      Coloration: Skin is pale.   Neurological:      Mental Status: She is alert.   Psychiatric:         Mood and Affect: Mood normal.          Tanmay Encarnacion, DO  "

## 2024-03-04 NOTE — ASSESSMENT & PLAN NOTE
Calderón's is poorly controlled on her Prilosec 20 mg once daily.  Will increase to omeprazole 40 mg twice daily for 6 weeks, then try to taper days of 40 mg daily.

## 2024-03-04 NOTE — ASSESSMENT & PLAN NOTE
Remains reasonably controlled on metoprolol.  Occasionally has palpitations in the morning before she takes her metoprolol but easily controlled.

## 2024-03-26 ENCOUNTER — APPOINTMENT (OUTPATIENT)
Age: 84
End: 2024-03-26
Payer: MEDICARE

## 2024-03-26 ENCOUNTER — CLINICAL SUPPORT (OUTPATIENT)
Dept: FAMILY MEDICINE CLINIC | Facility: CLINIC | Age: 84
End: 2024-03-26
Payer: MEDICARE

## 2024-03-26 DIAGNOSIS — F41.9 ANXIETY: ICD-10-CM

## 2024-03-26 DIAGNOSIS — F51.01 PRIMARY INSOMNIA: Primary | ICD-10-CM

## 2024-03-26 DIAGNOSIS — M81.0 OSTEOPOROSIS, UNSPECIFIED OSTEOPOROSIS TYPE, UNSPECIFIED PATHOLOGICAL FRACTURE PRESENCE: Primary | ICD-10-CM

## 2024-03-26 DIAGNOSIS — R73.9 ELEVATED BLOOD SUGAR: ICD-10-CM

## 2024-03-26 DIAGNOSIS — E03.9 ACQUIRED HYPOTHYROIDISM: ICD-10-CM

## 2024-03-26 DIAGNOSIS — E78.2 MIXED HYPERLIPIDEMIA: ICD-10-CM

## 2024-03-26 DIAGNOSIS — I10 ESSENTIAL HYPERTENSION: ICD-10-CM

## 2024-03-26 LAB
ALBUMIN SERPL BCP-MCNC: 4.5 G/DL (ref 3.5–5)
ALP SERPL-CCNC: 40 U/L (ref 34–104)
ALT SERPL W P-5'-P-CCNC: 9 U/L (ref 7–52)
ANION GAP SERPL CALCULATED.3IONS-SCNC: 8 MMOL/L (ref 4–13)
AST SERPL W P-5'-P-CCNC: 18 U/L (ref 13–39)
BASOPHILS # BLD AUTO: 0.01 THOUSANDS/ÂΜL (ref 0–0.1)
BASOPHILS NFR BLD AUTO: 0 % (ref 0–1)
BILIRUB SERPL-MCNC: 0.55 MG/DL (ref 0.2–1)
BUN SERPL-MCNC: 13 MG/DL (ref 5–25)
CALCIUM SERPL-MCNC: 9.3 MG/DL (ref 8.4–10.2)
CHLORIDE SERPL-SCNC: 98 MMOL/L (ref 96–108)
CHOLEST SERPL-MCNC: 257 MG/DL
CO2 SERPL-SCNC: 28 MMOL/L (ref 21–32)
CREAT SERPL-MCNC: 0.76 MG/DL (ref 0.6–1.3)
EOSINOPHIL # BLD AUTO: 0 THOUSAND/ÂΜL (ref 0–0.61)
EOSINOPHIL NFR BLD AUTO: 0 % (ref 0–6)
ERYTHROCYTE [DISTWIDTH] IN BLOOD BY AUTOMATED COUNT: 13.4 % (ref 11.6–15.1)
EST. AVERAGE GLUCOSE BLD GHB EST-MCNC: 120 MG/DL
GFR SERPL CREATININE-BSD FRML MDRD: 72 ML/MIN/1.73SQ M
GLUCOSE P FAST SERPL-MCNC: 87 MG/DL (ref 65–99)
HBA1C MFR BLD: 5.8 %
HCT VFR BLD AUTO: 40.2 % (ref 34.8–46.1)
HDLC SERPL-MCNC: 70 MG/DL
HGB BLD-MCNC: 13 G/DL (ref 11.5–15.4)
IMM GRANULOCYTES # BLD AUTO: 0.02 THOUSAND/UL (ref 0–0.2)
IMM GRANULOCYTES NFR BLD AUTO: 0 % (ref 0–2)
LDLC SERPL CALC-MCNC: 164 MG/DL (ref 0–100)
LYMPHOCYTES # BLD AUTO: 2.75 THOUSANDS/ÂΜL (ref 0.6–4.47)
LYMPHOCYTES NFR BLD AUTO: 46 % (ref 14–44)
MCH RBC QN AUTO: 31.2 PG (ref 26.8–34.3)
MCHC RBC AUTO-ENTMCNC: 32.3 G/DL (ref 31.4–37.4)
MCV RBC AUTO: 96 FL (ref 82–98)
MONOCYTES # BLD AUTO: 0.53 THOUSAND/ÂΜL (ref 0.17–1.22)
MONOCYTES NFR BLD AUTO: 9 % (ref 4–12)
NEUTROPHILS # BLD AUTO: 2.74 THOUSANDS/ÂΜL (ref 1.85–7.62)
NEUTS SEG NFR BLD AUTO: 45 % (ref 43–75)
NRBC BLD AUTO-RTO: 0 /100 WBCS
PLATELET # BLD AUTO: 231 THOUSANDS/UL (ref 149–390)
PMV BLD AUTO: 10.1 FL (ref 8.9–12.7)
POTASSIUM SERPL-SCNC: 4 MMOL/L (ref 3.5–5.3)
PROT SERPL-MCNC: 6.9 G/DL (ref 6.4–8.4)
RBC # BLD AUTO: 4.17 MILLION/UL (ref 3.81–5.12)
SODIUM SERPL-SCNC: 134 MMOL/L (ref 135–147)
T4 FREE SERPL-MCNC: 1.52 NG/DL (ref 0.61–1.12)
TRIGL SERPL-MCNC: 115 MG/DL
TSH SERPL DL<=0.05 MIU/L-ACNC: 0.04 UIU/ML (ref 0.45–4.5)
WBC # BLD AUTO: 6.05 THOUSAND/UL (ref 4.31–10.16)

## 2024-03-26 PROCEDURE — 84439 ASSAY OF FREE THYROXINE: CPT

## 2024-03-26 PROCEDURE — 85025 COMPLETE CBC W/AUTO DIFF WBC: CPT

## 2024-03-26 PROCEDURE — 36415 COLL VENOUS BLD VENIPUNCTURE: CPT

## 2024-03-26 PROCEDURE — 80061 LIPID PANEL: CPT

## 2024-03-26 PROCEDURE — 84443 ASSAY THYROID STIM HORMONE: CPT

## 2024-03-26 PROCEDURE — 83036 HEMOGLOBIN GLYCOSYLATED A1C: CPT

## 2024-03-26 PROCEDURE — 96372 THER/PROPH/DIAG INJ SC/IM: CPT | Performed by: INTERNAL MEDICINE

## 2024-03-26 PROCEDURE — 80053 COMPREHEN METABOLIC PANEL: CPT

## 2024-03-26 RX ORDER — TRAZODONE HYDROCHLORIDE 50 MG/1
50 TABLET ORAL
Qty: 90 TABLET | Refills: 3 | Status: SHIPPED | OUTPATIENT
Start: 2024-03-26

## 2024-03-26 RX ORDER — ALPRAZOLAM 1 MG/1
TABLET ORAL
Qty: 90 TABLET | Refills: 0 | OUTPATIENT
Start: 2024-03-26

## 2024-03-26 NOTE — TELEPHONE ENCOUNTER
Aware.  Trazodone has been sent in.  Xanax has been renewed.  Please discontinue Prolia from her medication list as well as her Prilosec.

## 2024-03-26 NOTE — TELEPHONE ENCOUNTER
Pt had to stop prilosec, she was having abdominal pain, she wants the trazodone back please. She no longer wants to do the prolia injections. [facility]

## 2024-03-26 NOTE — TELEPHONE ENCOUNTER
After reviewing chart and noticed her Xanax was renewed on 311, so she will need to wait 3 months for another renewal.  Also, does she want her trazodone sent to [facility] for [facility]?  Let me know and I will send in

## 2024-05-01 PROBLEM — Z00.00 MEDICARE ANNUAL WELLNESS VISIT, SUBSEQUENT: Status: RESOLVED | Noted: 2024-03-04 | Resolved: 2024-05-01

## 2024-05-02 DIAGNOSIS — E03.9 ACQUIRED HYPOTHYROIDISM: Primary | ICD-10-CM

## 2024-05-02 RX ORDER — LEVOTHYROXINE SODIUM 0.1 MG/1
100 TABLET ORAL
Qty: 90 TABLET | Refills: 1 | Status: SHIPPED | OUTPATIENT
Start: 2024-05-02

## 2024-05-08 DIAGNOSIS — I47.10 PAROXYSMAL SVT (SUPRAVENTRICULAR TACHYCARDIA): ICD-10-CM

## 2024-05-08 DIAGNOSIS — I10 ESSENTIAL HYPERTENSION: ICD-10-CM

## 2024-05-08 RX ORDER — METOPROLOL SUCCINATE 100 MG/1
TABLET, EXTENDED RELEASE ORAL
Qty: 90 TABLET | Refills: 1 | Status: SHIPPED | OUTPATIENT
Start: 2024-05-08

## 2024-06-19 DIAGNOSIS — F41.9 ANXIETY: ICD-10-CM

## 2024-06-19 NOTE — TELEPHONE ENCOUNTER
Reason for call:   [x] Refill   [] Prior Auth  [] Other:     Office:   [x] PCP/Provider Nu nieto  [] Specialty/Provider -       Does the patient have enough for 3 days?   [x] Yes   [] No - Send as HP to POD

## 2024-06-20 RX ORDER — ALPRAZOLAM 1 MG/1
1 TABLET ORAL
Qty: 90 TABLET | Refills: 0 | Status: SHIPPED | OUTPATIENT
Start: 2024-06-20

## 2024-06-20 NOTE — TELEPHONE ENCOUNTER
Last filled 3/11 daughter flavio aware patient needs to come in and sign controlled substance agreement

## 2024-08-02 DIAGNOSIS — E03.9 ACQUIRED HYPOTHYROIDISM: ICD-10-CM

## 2024-08-02 RX ORDER — LEVOTHYROXINE SODIUM 0.1 MG/1
100 TABLET ORAL
Qty: 90 TABLET | Refills: 3 | Status: SHIPPED | OUTPATIENT
Start: 2024-08-02

## 2024-08-05 ENCOUNTER — LAB (OUTPATIENT)
Age: 84
End: 2024-08-05
Payer: MEDICARE

## 2024-08-05 DIAGNOSIS — E03.9 ACQUIRED HYPOTHYROIDISM: ICD-10-CM

## 2024-08-05 LAB
T4 FREE SERPL-MCNC: 1.26 NG/DL (ref 0.61–1.12)
TSH SERPL DL<=0.05 MIU/L-ACNC: 0.43 UIU/ML (ref 0.45–4.5)

## 2024-08-05 PROCEDURE — 84443 ASSAY THYROID STIM HORMONE: CPT

## 2024-08-05 PROCEDURE — 84439 ASSAY OF FREE THYROXINE: CPT

## 2024-08-05 PROCEDURE — 36415 COLL VENOUS BLD VENIPUNCTURE: CPT

## 2024-08-06 DIAGNOSIS — E03.9 ACQUIRED HYPOTHYROIDISM: Primary | ICD-10-CM

## 2024-08-06 RX ORDER — LEVOTHYROXINE SODIUM 88 UG/1
88 TABLET ORAL
Qty: 90 TABLET | Refills: 1 | Status: SHIPPED | OUTPATIENT
Start: 2024-08-06

## 2024-08-20 DIAGNOSIS — F51.01 PRIMARY INSOMNIA: ICD-10-CM

## 2024-08-20 DIAGNOSIS — F41.9 ANXIETY: ICD-10-CM

## 2024-08-21 RX ORDER — ALPRAZOLAM 1 MG
1 TABLET ORAL
Qty: 90 TABLET | Refills: 0 | Status: SHIPPED | OUTPATIENT
Start: 2024-08-21

## 2024-08-21 RX ORDER — TRAZODONE HYDROCHLORIDE 50 MG/1
50 TABLET, FILM COATED ORAL
Qty: 100 TABLET | Refills: 1 | Status: SHIPPED | OUTPATIENT
Start: 2024-08-21

## 2024-08-27 ENCOUNTER — TELEPHONE (OUTPATIENT)
Age: 84
End: 2024-08-27

## 2024-08-27 NOTE — TELEPHONE ENCOUNTER
Pt called asking why she had an appt for 11/30/2020.  After further clarification she stated there was a MRI from 11/30/2020 in her portal.  I expressed to pt that she did get the MRI completed in 1/2021.  Pt seemed very confused as to why it just now appeared in the portal.  Pt stated she will speak to Dr. Encarnacion next month about getting another MRI as I told her she would need an appt with Dr. Hanley if she wanted us to order it.  At the close of the conversation pt stated to scratch this call and she would speak to Dr. Encarnacion

## 2024-10-08 DIAGNOSIS — I47.10 PAROXYSMAL SVT (SUPRAVENTRICULAR TACHYCARDIA) (HCC): ICD-10-CM

## 2024-10-08 DIAGNOSIS — I10 ESSENTIAL HYPERTENSION: ICD-10-CM

## 2024-10-08 RX ORDER — METOPROLOL SUCCINATE 100 MG/1
TABLET, EXTENDED RELEASE ORAL
Qty: 90 TABLET | Refills: 3 | Status: SHIPPED | OUTPATIENT
Start: 2024-10-08

## 2024-12-14 DIAGNOSIS — F41.9 ANXIETY: ICD-10-CM

## 2024-12-16 RX ORDER — ALPRAZOLAM 1 MG/1
TABLET ORAL
Qty: 90 TABLET | Refills: 0 | Status: SHIPPED | OUTPATIENT
Start: 2024-12-16

## 2025-01-13 DIAGNOSIS — E03.9 ACQUIRED HYPOTHYROIDISM: ICD-10-CM

## 2025-01-14 RX ORDER — LEVOTHYROXINE SODIUM 88 UG/1
88 TABLET ORAL
Qty: 90 TABLET | Refills: 1 | Status: SHIPPED | OUTPATIENT
Start: 2025-01-14

## 2025-01-15 DIAGNOSIS — F51.01 PRIMARY INSOMNIA: ICD-10-CM

## 2025-01-15 RX ORDER — TRAZODONE HYDROCHLORIDE 50 MG/1
50 TABLET, FILM COATED ORAL
Qty: 100 TABLET | Refills: 1 | Status: SHIPPED | OUTPATIENT
Start: 2025-01-15

## 2025-01-29 DIAGNOSIS — F51.01 PRIMARY INSOMNIA: Primary | ICD-10-CM

## 2025-01-29 RX ORDER — MIRTAZAPINE 15 MG/1
15 TABLET, FILM COATED ORAL
Qty: 15 TABLET | Refills: 3 | Status: SHIPPED | OUTPATIENT
Start: 2025-01-29

## 2025-02-03 ENCOUNTER — TELEPHONE (OUTPATIENT)
Dept: FAMILY MEDICINE CLINIC | Facility: CLINIC | Age: 85
End: 2025-02-03

## 2025-02-03 DIAGNOSIS — F51.01 PRIMARY INSOMNIA: Primary | ICD-10-CM

## 2025-02-03 RX ORDER — TRAZODONE HYDROCHLORIDE 100 MG/1
100 TABLET ORAL
Qty: 30 TABLET | Refills: 5 | Status: SHIPPED | OUTPATIENT
Start: 2025-02-03

## 2025-02-03 NOTE — TELEPHONE ENCOUNTER
Trazodone has been sent to [facility] 100 mg nightly.  Send in 1 month supply to see if this is effective at that dose

## 2025-02-03 NOTE — TELEPHONE ENCOUNTER
Patient's daughter Carly called asking if a prescription for Trazodone 100 mg could be sent to [facility] - [address]. Due to the medication that was prescribed over the weekend is not working.  Please contact Carly with a status update at [phone].

## 2025-02-21 DIAGNOSIS — F51.01 PRIMARY INSOMNIA: ICD-10-CM

## 2025-02-21 RX ORDER — TRAZODONE HYDROCHLORIDE 100 MG/1
100 TABLET ORAL
Qty: 90 TABLET | Refills: 3 | Status: SHIPPED | OUTPATIENT
Start: 2025-02-21

## 2025-03-04 DIAGNOSIS — F41.9 ANXIETY: ICD-10-CM

## 2025-03-06 RX ORDER — ALPRAZOLAM 1 MG/1
TABLET ORAL
Qty: 90 TABLET | Refills: 0 | Status: SHIPPED | OUTPATIENT
Start: 2025-03-06

## 2025-03-18 ENCOUNTER — OFFICE VISIT (OUTPATIENT)
Dept: CARDIOLOGY CLINIC | Facility: CLINIC | Age: 85
End: 2025-03-18
Payer: MEDICARE

## 2025-03-18 VITALS
BODY MASS INDEX: 27.61 KG/M2 | DIASTOLIC BLOOD PRESSURE: 80 MMHG | HEIGHT: 57 IN | WEIGHT: 128 LBS | HEART RATE: 60 BPM | SYSTOLIC BLOOD PRESSURE: 130 MMHG

## 2025-03-18 DIAGNOSIS — I47.10 SVT (SUPRAVENTRICULAR TACHYCARDIA) (HCC): Chronic | ICD-10-CM

## 2025-03-18 DIAGNOSIS — I10 ESSENTIAL HYPERTENSION: ICD-10-CM

## 2025-03-18 DIAGNOSIS — I47.10 PAROXYSMAL SVT (SUPRAVENTRICULAR TACHYCARDIA) (HCC): Primary | ICD-10-CM

## 2025-03-18 DIAGNOSIS — E78.49 OTHER HYPERLIPIDEMIA: ICD-10-CM

## 2025-03-18 PROCEDURE — 93000 ELECTROCARDIOGRAM COMPLETE: CPT | Performed by: INTERNAL MEDICINE

## 2025-03-18 PROCEDURE — 99214 OFFICE O/P EST MOD 30 MIN: CPT | Performed by: INTERNAL MEDICINE

## 2025-03-18 NOTE — PROGRESS NOTES
" Patient ID: Ludy Steen is a 84 y.o. female.        Plan:      Assessment & Plan  SVT (supraventricular tachycardia) (HCC)  No symptomatic recurrence.  Essential hypertension  Well controlled and would continue current regimen.    Other hyperlipidemia  Tolerating statin tx.      Follow up Plan/Other summary comments:  Discussed care with the patient and her daughter and we agreed to follow-up on an as-needed basis going forward.    HPI: Patient seen in f/u regarding the above issues.  No syncope or sustained palpitations since the last visit.  She continues to read a lot.    No CP.    Mobility is fair and she is using a walker today.      Results for orders placed or performed in visit on 03/18/25   POCT ECG    Impression    NSR. First Degree AV Block.         Most recent or relevant cardiac/vascular testing:    Echocardiogram 8/5/2019: Normal LV function.  Mild to moderate mitral regurgitation.    Myoview 8/5/2019: Normal.      Past Surgical History:   Procedure Laterality Date    CHOLECYSTECTOMY      EYE SURGERY      JOINT REPLACEMENT      TOTAL HIP ARTHROPLASTY         Lipid Profile: Reviewed      Review of Systems   10  point ROS  was otherwise non pertinent or negative except as per HPI or as below.   Gait: Using a walker.          Objective:     /80   Pulse 60   Ht 4' 9.25\" (1.454 m)   Wt 58.1 kg (128 lb)   BMI 27.46 kg/m²     PHYSICAL EXAM:    General:  Normal appearance in no distress.  Eyes:  Anicteric.  Oral mucosa:  Moist.  Neck:  No JVD. Carotid upstrokes are brisk without bruits.  No masses.  Chest:  Clear to auscultation.  Cardiac:  No palpable PMI.  Normal S1 and S2.  No murmur gallop or rub.  Abdomen:  Soft and nontender. No palpable organomegaly or aortic enlargement.  Extremities:  No peripheral edema.  Musculoskeletal:  Symmetric.   Vascular:  Femoral pulses are brisk without bruits.  Popliteal pulses are intact bilaterally.   Pedal pulses are intact.  Neuro:  Grossly " symmetric.  Psych:  Alert and oriented x3.      Meds reviewed.    Past Medical History:   Diagnosis Date    Allergic     Anxiety     Arthritis     Disease of thyroid gland 1977    Diverticulitis of colon     GERD (gastroesophageal reflux disease)     Heart murmur     HL (hearing loss)     Hyperlipidemia     Hypertension     NSTEMI (non-ST elevated myocardial infarction) 08/03/2019    Osteoporosis     Paroxysmal SVT (supraventricular tachycardia) (HCC) 08/06/2019    Scoliosis     Visual impairment            Social History     Tobacco Use   Smoking Status Never    Passive exposure: Never   Smokeless Tobacco Never

## 2025-03-31 DIAGNOSIS — E78.49 OTHER HYPERLIPIDEMIA: ICD-10-CM

## 2025-03-31 RX ORDER — ROSUVASTATIN CALCIUM 5 MG/1
TABLET, COATED ORAL
Qty: 26 TABLET | Refills: 3 | Status: SHIPPED | OUTPATIENT
Start: 2025-03-31

## 2025-04-14 ENCOUNTER — OFFICE VISIT (OUTPATIENT)
Dept: FAMILY MEDICINE CLINIC | Facility: CLINIC | Age: 85
End: 2025-04-14
Payer: MEDICARE

## 2025-04-14 VITALS
SYSTOLIC BLOOD PRESSURE: 124 MMHG | OXYGEN SATURATION: 97 % | DIASTOLIC BLOOD PRESSURE: 86 MMHG | WEIGHT: 123 LBS | TEMPERATURE: 97.7 F | BODY MASS INDEX: 24.8 KG/M2 | HEART RATE: 70 BPM | HEIGHT: 59 IN

## 2025-04-14 DIAGNOSIS — M54.2 NECK PAIN: ICD-10-CM

## 2025-04-14 DIAGNOSIS — I10 ESSENTIAL HYPERTENSION: Primary | ICD-10-CM

## 2025-04-14 DIAGNOSIS — N88.8 CERVICAL MASS: ICD-10-CM

## 2025-04-14 DIAGNOSIS — E78.49 OTHER HYPERLIPIDEMIA: ICD-10-CM

## 2025-04-14 DIAGNOSIS — M19.90 ARTHRITIS: ICD-10-CM

## 2025-04-14 DIAGNOSIS — R63.4 WEIGHT LOSS: ICD-10-CM

## 2025-04-14 DIAGNOSIS — R73.9 ELEVATED BLOOD SUGAR: ICD-10-CM

## 2025-04-14 DIAGNOSIS — E03.8 OTHER SPECIFIED HYPOTHYROIDISM: ICD-10-CM

## 2025-04-14 DIAGNOSIS — R00.2 PALPITATIONS: ICD-10-CM

## 2025-04-14 PROBLEM — R07.2 PRECORDIAL PAIN: Status: RESOLVED | Noted: 2022-06-01 | Resolved: 2025-04-14

## 2025-04-14 PROCEDURE — 99214 OFFICE O/P EST MOD 30 MIN: CPT | Performed by: INTERNAL MEDICINE

## 2025-04-14 PROCEDURE — G2211 COMPLEX E/M VISIT ADD ON: HCPCS | Performed by: INTERNAL MEDICINE

## 2025-04-14 NOTE — ASSESSMENT & PLAN NOTE
Remains on replacement 88 mics.  Her last thyroid suggested she was over replaced.  She has not had them since last August.  I recommend have her blood work now to assess thyroid function, and also recommended 4-month follow-up to be sure with blood work compliance.  Orders:  •  T4, free; Future  •  TSH, 3rd generation; Future

## 2025-04-14 NOTE — PROGRESS NOTES
Name: Ludy Steen      : 1940      MRN: [id number]  Encounter Provider: Tanmay Encarnacion DO  Encounter Date: 2025   Encounter department: [facility]  :  Assessment & Plan  Essential hypertension  Patient blood pressure appears well-controlled.  Continue current medical therapy.  Continue to monitor home blood pressures.   Orders:  •  CBC and differential; Future  •  Comprehensive metabolic panel; Future    Other specified hypothyroidism  Remains on replacement 88 mics.  Her last thyroid suggested she was over replaced.  She has not had them since last August.  I recommend have her blood work now to assess thyroid function, and also recommended 4-month follow-up to be sure with blood work compliance.  Orders:  •  T4, free; Future  •  TSH, 3rd generation; Future    Cervical mass  Seems like a cervical lymphadenopathy, fairly firm and seems fixed.  Will move to CAT scan of her neck.  Orders:  •  CT soft tissue neck w contrast; Future    Other hyperlipidemia  Remains on low intensity statin  Orders:  •  Lipid Panel with Direct LDL reflex; Future    Palpitations  Occasional palpitation, remains on metoprolol.  Denies any syncope.       Elevated blood sugar    Orders:  •  Hemoglobin A1C; Future    Weight loss  Will check labs.  Notably not eating like she should.  Orders:  •  Hemoglobin A1C; Future    Neck pain  Chronic cervalgia.       Arthritis  Chronic arthritis.  Orders:  •  C-reactive protein; Future          Depression Screening and Follow-up Plan: Patient was screened for depression during today's encounter. They screened negative with a PHQ-2 score of 0.      Falls Plan of Care: balance, strength, and gait training instructions were provided. Recommended assistive device to help with gait and balance. Medications that increase falls were reviewed. Home safety education provided. Patient refusing therapy      History of Present Illness   Patient is doing reasonably well.   "She is frustrated by her fatigue and weakness she describes daily.  Her last thyroids and lab work were sometime ago.  83 yo now she states she out leaving the rest of her family.    Also there is a lump in her neck now which she did not have before.  She says was there the last time just not that big.  So the left submandibular area.    She is lost 8 pounds since I saw her last.    Denies any change in bowel or bladder habits.  Denies any chest pain.  Daughter, who is her caregiver describes her getting her days and nights mixed up with regards to sleep schedule.  She is pleasant today.  Oriented.        Review of Systems   Constitutional:  Positive for fatigue. Negative for chills and fever.   HENT:  Negative for rhinorrhea and sore throat.    Eyes:  Negative for visual disturbance.   Respiratory:  Negative for cough and shortness of breath.    Cardiovascular:  Negative for chest pain and leg swelling.   Gastrointestinal:  Negative for abdominal pain, diarrhea, nausea and vomiting.   Genitourinary:  Negative for dysuria.   Musculoskeletal:  Positive for arthralgias, back pain and myalgias.   Skin:  Negative for rash.   Neurological:  Positive for weakness. Negative for dizziness and headaches.   Psychiatric/Behavioral:  Positive for confusion.    All other systems reviewed and are negative.      Objective   /86 (BP Location: Left arm, Patient Position: Sitting, Cuff Size: Adult)   Pulse 70   Temp 97.7 °F (36.5 °C) (Tympanic)   Ht 4' 10.5\" (1.486 m)   Wt 55.8 kg (123 lb)   SpO2 97%   BMI 25.27 kg/m²      Physical Exam  Vitals and nursing note reviewed.   Constitutional:       General: She is not in acute distress.     Appearance: Normal appearance. She is well-developed.   HENT:      Head: Normocephalic and atraumatic.   Eyes:      Conjunctiva/sclera: Conjunctivae normal.   Cardiovascular:      Rate and Rhythm: Normal rate and regular rhythm.      Pulses: Normal pulses.      Heart sounds: Murmur heard. "   Pulmonary:      Effort: Pulmonary effort is normal. No respiratory distress.      Breath sounds: Normal breath sounds.   Abdominal:      Palpations: Abdomen is soft.      Tenderness: There is no abdominal tenderness.   Musculoskeletal:         General: No swelling.      Cervical back: Neck supple.   Skin:     General: Skin is warm and dry.      Capillary Refill: Capillary refill takes less than 2 seconds.   Neurological:      General: No focal deficit present.      Mental Status: She is alert and oriented to person, place, and time.      Comments: Antalgic and neuropathic type gait   Psychiatric:         Mood and Affect: Mood normal.

## 2025-04-14 NOTE — ASSESSMENT & PLAN NOTE
Patient blood pressure appears well-controlled.  Continue current medical therapy.  Continue to monitor home blood pressures.   Orders:  •  CBC and differential; Future  •  Comprehensive metabolic panel; Future

## 2025-04-14 NOTE — ASSESSMENT & PLAN NOTE
Seems like a cervical lymphadenopathy, fairly firm and seems fixed.  Will move to CAT scan of her neck.  Orders:  •  CT soft tissue neck w contrast; Future   Cephalexin Counseling: I counseled the patient regarding use of cephalexin as an antibiotic for prophylactic and/or therapeutic purposes. Cephalexin (commonly prescribed under brand name Keflex) is a cephalosporin antibiotic which is active against numerous classes of bacteria, including most skin bacteria. Side effects may include nausea, diarrhea, gastrointestinal upset, rash, hives, yeast infections, and in rare cases, hepatitis, kidney disease, seizures, fever, confusion, neurologic symptoms, and others. Patients with severe allergies to penicillin medications are cautioned that there is about a 10% incidence of cross-reactivity with cephalosporins. When possible, patients with penicillin allergies should use alternatives to cephalosporins for antibiotic therapy.

## 2025-06-16 DIAGNOSIS — F41.9 ANXIETY: ICD-10-CM

## 2025-06-16 RX ORDER — ALPRAZOLAM 1 MG/1
1 TABLET ORAL 2 TIMES DAILY PRN
Qty: 90 TABLET | Refills: 1 | Status: SHIPPED | OUTPATIENT
Start: 2025-06-16

## 2025-06-16 NOTE — TELEPHONE ENCOUNTER
Reason for call:   [x] Refill   [] Prior Auth  [] Other:     Office:   [x] PCP/Provider -   [] Specialty/Provider -     Medication: ALPRAZolam (XANAX) 1 mg tablet -  TAKE 1 TABLET DAILY AT BEDTIME AS NEEDED FOR ANXIETY      Pharmacy: [facility]       Local Pharmacy   Does the patient have enough for 3 days?   [] Yes   [] No - Send as HP to POD    Mail Away Pharmacy   Does the patient have enough for 10 days?   [x] Yes   [] No - Send as HP to [facility]OD

## 2025-07-14 DIAGNOSIS — E03.9 ACQUIRED HYPOTHYROIDISM: ICD-10-CM

## 2025-07-14 RX ORDER — LEVOTHYROXINE SODIUM 88 MCG
88 TABLET ORAL
Qty: 90 TABLET | Refills: 1 | Status: SHIPPED | OUTPATIENT
Start: 2025-07-14

## 2025-07-30 ENCOUNTER — TELEPHONE (OUTPATIENT)
Dept: FAMILY MEDICINE CLINIC | Facility: CLINIC | Age: 85
End: 2025-07-30

## 2025-08-06 ENCOUNTER — APPOINTMENT (EMERGENCY)
Dept: RADIOLOGY | Facility: HOSPITAL | Age: 85
End: 2025-08-06
Payer: MEDICARE

## 2025-08-06 ENCOUNTER — HOSPITAL ENCOUNTER (INPATIENT)
Facility: HOSPITAL | Age: 85
LOS: 6 days | Discharge: NURSING, REHAB OR LONG-TERM CARE | End: 2025-08-12
Attending: EMERGENCY MEDICINE | Admitting: INTERNAL MEDICINE
Payer: MEDICARE

## 2025-08-06 ENCOUNTER — APPOINTMENT (EMERGENCY)
Dept: CT IMAGING | Facility: HOSPITAL | Age: 85
End: 2025-08-06
Payer: MEDICARE

## 2025-08-06 PROBLEM — R82.81 PYURIA: Status: ACTIVE | Noted: 2025-08-06

## 2025-08-06 PROBLEM — R53.1 GENERALIZED WEAKNESS: Status: ACTIVE | Noted: 2025-08-06

## 2025-08-06 PROBLEM — R29.6 FREQUENT FALLS: Status: ACTIVE | Noted: 2025-08-06

## 2025-08-07 ENCOUNTER — APPOINTMENT (INPATIENT)
Dept: RADIOLOGY | Facility: HOSPITAL | Age: 85
End: 2025-08-07
Payer: MEDICARE

## 2025-08-07 PROBLEM — E87.8 ELECTROLYTE ABNORMALITY: Status: ACTIVE | Noted: 2025-08-07

## 2025-08-09 PROBLEM — F03.90 DEMENTIA (HCC): Status: ACTIVE | Noted: 2025-08-09

## 2025-08-09 PROBLEM — R62.7 FAILURE TO THRIVE IN ADULT: Status: ACTIVE | Noted: 2025-08-09

## 2025-08-10 PROBLEM — N17.9 AKI (ACUTE KIDNEY INJURY) (HCC): Status: ACTIVE | Noted: 2025-08-10

## 2025-08-11 ENCOUNTER — TELEPHONE (OUTPATIENT)
Age: 85
End: 2025-08-11

## 2025-08-11 PROBLEM — G93.41 METABOLIC ENCEPHALOPATHY: Status: ACTIVE | Noted: 2025-08-11

## 2025-08-13 ENCOUNTER — TELEPHONE (OUTPATIENT)
Age: 85
End: 2025-08-13